# Patient Record
Sex: FEMALE | Race: WHITE | Employment: FULL TIME | URBAN - METROPOLITAN AREA
[De-identification: names, ages, dates, MRNs, and addresses within clinical notes are randomized per-mention and may not be internally consistent; named-entity substitution may affect disease eponyms.]

---

## 2017-03-27 ENCOUNTER — INITIAL CONSULT (OUTPATIENT)
Dept: SURGERY | Age: 42
End: 2017-03-27

## 2017-03-27 VITALS
WEIGHT: 187 LBS | BODY MASS INDEX: 31.92 KG/M2 | SYSTOLIC BLOOD PRESSURE: 100 MMHG | DIASTOLIC BLOOD PRESSURE: 60 MMHG | HEIGHT: 64 IN

## 2017-03-27 DIAGNOSIS — K80.00 ACUTE CALCULOUS CHOLECYSTITIS: Primary | ICD-10-CM

## 2017-03-27 PROCEDURE — 99243 OFF/OP CNSLTJ NEW/EST LOW 30: CPT | Performed by: SURGERY

## 2017-03-28 ENCOUNTER — HOSPITAL ENCOUNTER (OUTPATIENT)
Dept: SURGERY | Age: 42
Discharge: HOME | End: 2017-03-28
Attending: SURGERY | Admitting: SURGERY

## 2017-03-28 VITALS
SYSTOLIC BLOOD PRESSURE: 106 MMHG | RESPIRATION RATE: 13 BRPM | HEART RATE: 103 BPM | DIASTOLIC BLOOD PRESSURE: 69 MMHG | TEMPERATURE: 97 F | OXYGEN SATURATION: 95 %

## 2017-03-28 LAB
ALBUMIN SERPL-MCNC: 4.7 G/DL (ref 3.4–5)
ALP BLD-CCNC: 106 U/L (ref 40–129)
ALT SERPL-CCNC: 31 U/L (ref 10–40)
AST SERPL-CCNC: 47 U/L (ref 15–37)
BILIRUB SERPL-MCNC: 0.3 MG/DL (ref 0–1)
BILIRUBIN DIRECT: <0.2 MG/DL (ref 0–0.3)
BILIRUBIN, INDIRECT: ABNORMAL MG/DL (ref 0–1)
TOTAL PROTEIN: 7.7 G/DL (ref 6.4–8.2)

## 2017-03-28 PROCEDURE — 47562 LAPAROSCOPIC CHOLECYSTECTOMY: CPT | Performed by: SURGERY

## 2017-03-28 RX ORDER — LIDOCAINE HYDROCHLORIDE 10 MG/ML
INJECTION, SOLUTION EPIDURAL; INFILTRATION; INTRACAUDAL; PERINEURAL
Status: COMPLETED
Start: 2017-03-28 | End: 2017-03-28

## 2017-03-28 RX ORDER — HYDROMORPHONE HCL 110MG/55ML
0.5 PATIENT CONTROLLED ANALGESIA SYRINGE INTRAVENOUS EVERY 5 MIN PRN
Status: COMPLETED | OUTPATIENT
Start: 2017-03-28 | End: 2017-03-28

## 2017-03-28 RX ORDER — HYDRALAZINE HYDROCHLORIDE 20 MG/ML
5 INJECTION INTRAMUSCULAR; INTRAVENOUS EVERY 10 MIN PRN
Status: DISCONTINUED | OUTPATIENT
Start: 2017-03-28 | End: 2017-03-29 | Stop reason: HOSPADM

## 2017-03-28 RX ORDER — OXYCODONE HYDROCHLORIDE AND ACETAMINOPHEN 5; 325 MG/1; MG/1
2 TABLET ORAL ONCE
Status: COMPLETED | OUTPATIENT
Start: 2017-03-28 | End: 2017-03-28

## 2017-03-28 RX ORDER — SODIUM CHLORIDE 0.9 % (FLUSH) 0.9 %
10 SYRINGE (ML) INJECTION PRN
Status: DISCONTINUED | OUTPATIENT
Start: 2017-03-28 | End: 2017-03-29 | Stop reason: HOSPADM

## 2017-03-28 RX ORDER — MIDAZOLAM HYDROCHLORIDE 1 MG/ML
2 INJECTION INTRAMUSCULAR; INTRAVENOUS ONCE
Status: COMPLETED | OUTPATIENT
Start: 2017-03-28 | End: 2017-03-28

## 2017-03-28 RX ORDER — SODIUM CHLORIDE, SODIUM LACTATE, POTASSIUM CHLORIDE, CALCIUM CHLORIDE 600; 310; 30; 20 MG/100ML; MG/100ML; MG/100ML; MG/100ML
INJECTION, SOLUTION INTRAVENOUS
Status: COMPLETED
Start: 2017-03-28 | End: 2017-03-28

## 2017-03-28 RX ORDER — APREPITANT 40 MG/1
40 CAPSULE ORAL ONCE
Status: COMPLETED | OUTPATIENT
Start: 2017-03-28 | End: 2017-03-28

## 2017-03-28 RX ORDER — HEPARIN SODIUM 5000 [USP'U]/ML
5000 INJECTION, SOLUTION INTRAVENOUS; SUBCUTANEOUS EVERY 8 HOURS SCHEDULED
Status: DISCONTINUED | OUTPATIENT
Start: 2017-03-28 | End: 2017-03-29 | Stop reason: HOSPADM

## 2017-03-28 RX ORDER — OXYCODONE HYDROCHLORIDE 5 MG/1
TABLET ORAL
Qty: 40 TABLET | Refills: 0 | Status: SHIPPED | OUTPATIENT
Start: 2017-03-28 | End: 2018-06-26 | Stop reason: HOSPADM

## 2017-03-28 RX ORDER — SODIUM CHLORIDE 0.9 % (FLUSH) 0.9 %
10 SYRINGE (ML) INJECTION EVERY 12 HOURS SCHEDULED
Status: DISCONTINUED | OUTPATIENT
Start: 2017-03-28 | End: 2017-03-29 | Stop reason: HOSPADM

## 2017-03-28 RX ORDER — ONDANSETRON 2 MG/ML
4 INJECTION INTRAMUSCULAR; INTRAVENOUS
Status: COMPLETED | OUTPATIENT
Start: 2017-03-28 | End: 2017-03-28

## 2017-03-28 RX ORDER — LABETALOL HYDROCHLORIDE 5 MG/ML
5 INJECTION, SOLUTION INTRAVENOUS EVERY 10 MIN PRN
Status: DISCONTINUED | OUTPATIENT
Start: 2017-03-28 | End: 2017-03-29 | Stop reason: HOSPADM

## 2017-03-28 RX ORDER — PROMETHAZINE HYDROCHLORIDE 25 MG/ML
6.25 INJECTION, SOLUTION INTRAMUSCULAR; INTRAVENOUS EVERY 10 MIN PRN
Status: DISCONTINUED | OUTPATIENT
Start: 2017-03-28 | End: 2017-03-29 | Stop reason: HOSPADM

## 2017-03-28 RX ORDER — OXYCODONE HYDROCHLORIDE AND ACETAMINOPHEN 5; 325 MG/1; MG/1
1 TABLET ORAL
Status: ACTIVE | OUTPATIENT
Start: 2017-03-28 | End: 2017-03-28

## 2017-03-28 RX ORDER — SCOLOPAMINE TRANSDERMAL SYSTEM 1 MG/1
1 PATCH, EXTENDED RELEASE TRANSDERMAL ONCE
Status: DISCONTINUED | OUTPATIENT
Start: 2017-03-28 | End: 2017-03-29 | Stop reason: HOSPADM

## 2017-03-28 RX ORDER — MEPERIDINE HYDROCHLORIDE 25 MG/ML
12.5 INJECTION INTRAMUSCULAR; INTRAVENOUS; SUBCUTANEOUS EVERY 5 MIN PRN
Status: DISCONTINUED | OUTPATIENT
Start: 2017-03-28 | End: 2017-03-29 | Stop reason: HOSPADM

## 2017-03-28 RX ADMIN — MEPERIDINE HYDROCHLORIDE 12.5 MG: 25 INJECTION INTRAMUSCULAR; INTRAVENOUS; SUBCUTANEOUS at 15:35

## 2017-03-28 RX ADMIN — Medication 0.5 MG: at 15:35

## 2017-03-28 RX ADMIN — MIDAZOLAM HYDROCHLORIDE 2 MG: 1 INJECTION INTRAMUSCULAR; INTRAVENOUS at 12:09

## 2017-03-28 RX ADMIN — HEPARIN SODIUM 5000 UNITS: 5000 INJECTION, SOLUTION INTRAVENOUS; SUBCUTANEOUS at 12:09

## 2017-03-28 RX ADMIN — SODIUM CHLORIDE, SODIUM LACTATE, POTASSIUM CHLORIDE, CALCIUM CHLORIDE 1000 ML: 600; 310; 30; 20 INJECTION, SOLUTION INTRAVENOUS at 12:10

## 2017-03-28 RX ADMIN — Medication 0.5 MG: at 15:45

## 2017-03-28 RX ADMIN — ONDANSETRON 4 MG: 2 INJECTION INTRAMUSCULAR; INTRAVENOUS at 15:36

## 2017-03-28 RX ADMIN — OXYCODONE HYDROCHLORIDE AND ACETAMINOPHEN 2 TABLET: 5; 325 TABLET ORAL at 16:30

## 2017-03-28 RX ADMIN — Medication 0.5 MG: at 15:40

## 2017-03-28 RX ADMIN — LIDOCAINE HYDROCHLORIDE 2 ML: 10 INJECTION, SOLUTION EPIDURAL; INFILTRATION; INTRACAUDAL; PERINEURAL at 12:09

## 2017-03-28 RX ADMIN — Medication 0.5 MG: at 15:50

## 2017-03-28 RX ADMIN — APREPITANT 40 MG: 40 CAPSULE ORAL at 12:09

## 2017-03-28 ASSESSMENT — PAIN SCALES - GENERAL
PAINLEVEL_OUTOF10: 7
PAINLEVEL_OUTOF10: 6
PAINLEVEL_OUTOF10: 8
PAINLEVEL_OUTOF10: 0

## 2017-03-28 ASSESSMENT — PAIN - FUNCTIONAL ASSESSMENT: PAIN_FUNCTIONAL_ASSESSMENT: 0-10

## 2017-04-10 ENCOUNTER — OFFICE VISIT (OUTPATIENT)
Dept: SURGERY | Age: 42
End: 2017-04-10

## 2017-04-10 VITALS
DIASTOLIC BLOOD PRESSURE: 64 MMHG | SYSTOLIC BLOOD PRESSURE: 110 MMHG | HEIGHT: 64 IN | WEIGHT: 189 LBS | BODY MASS INDEX: 32.27 KG/M2

## 2017-04-10 DIAGNOSIS — Z09 SURGERY FOLLOW-UP EXAMINATION: Primary | ICD-10-CM

## 2017-04-10 PROCEDURE — 99024 POSTOP FOLLOW-UP VISIT: CPT | Performed by: SURGERY

## 2017-04-10 RX ORDER — ONDANSETRON 4 MG/1
4 TABLET, ORALLY DISINTEGRATING ORAL EVERY 8 HOURS PRN
Qty: 15 TABLET | Refills: 1 | Status: SHIPPED | OUTPATIENT
Start: 2017-04-10 | End: 2018-07-21 | Stop reason: ALTCHOICE

## 2017-04-13 ENCOUNTER — TELEPHONE (OUTPATIENT)
Dept: SURGERY | Age: 42
End: 2017-04-13

## 2017-04-20 ENCOUNTER — TELEPHONE (OUTPATIENT)
Dept: SURGERY | Age: 42
End: 2017-04-20

## 2017-04-20 RX ORDER — OMEPRAZOLE 20 MG/1
20 TABLET, DELAYED RELEASE ORAL DAILY
Qty: 30 TABLET | Refills: 1 | Status: SHIPPED | OUTPATIENT
Start: 2017-04-20 | End: 2018-04-02 | Stop reason: DRUGHIGH

## 2018-02-26 ENCOUNTER — OFFICE VISIT (OUTPATIENT)
Dept: ORTHOPEDIC SURGERY | Age: 43
End: 2018-02-26

## 2018-02-26 VITALS
SYSTOLIC BLOOD PRESSURE: 111 MMHG | WEIGHT: 179.9 LBS | BODY MASS INDEX: 30.71 KG/M2 | HEART RATE: 109 BPM | DIASTOLIC BLOOD PRESSURE: 76 MMHG | HEIGHT: 64 IN

## 2018-02-26 DIAGNOSIS — S42.254A NONDISPLACED FRACTURE OF GREATER TUBEROSITY OF RIGHT HUMERUS, INITIAL ENCOUNTER FOR CLOSED FRACTURE: Primary | ICD-10-CM

## 2018-02-26 PROCEDURE — 99203 OFFICE O/P NEW LOW 30 MIN: CPT | Performed by: ORTHOPAEDIC SURGERY

## 2018-02-26 PROCEDURE — L3660 SO 8 AB RSTR CAN/WEB PRE OTS: HCPCS | Performed by: ORTHOPAEDIC SURGERY

## 2018-02-26 NOTE — PROGRESS NOTES
Disp: , Rfl:     levothyroxine (SYNTHROID) 88 MCG tablet, Take 125 mcg by mouth daily , Disp: , Rfl:     aspirin 81 MG tablet, Take 81 mg by mouth daily. , Disp: , Rfl:     diazepam (VALIUM) 5 MG tablet, Take 5 mg by mouth nightly as needed for Anxiety (for headache). , Disp: , Rfl:     SUMAtriptan (IMITREX) 100 MG tablet, Take 1 tablet by mouth once as needed for Migraine for 1 dose., Disp: 9 tablet, Rfl: 0    folic acid (FOLVITE) 969 MCG tablet, Take 800 mcg by mouth nightly., Disp: , Rfl:     SUMAtriptan Succinate (SUMAVEL DOSEPRO) 6 MG/0.5ML ROSINA, Inject  into the skin as needed. , Disp: , Rfl:     promethazine (PHENERGAN) 25 MG tablet, Take 1 tablet by mouth every 6 hours as needed for Nausea., Disp: 12 tablet, Rfl: 0    omeprazole (PRILOSEC) 40 MG capsule, Take 40 mg by mouth daily. , Disp: , Rfl:   Allergies:  Codeine and Penicillins  Social History:    reports that she has never smoked. She has never used smokeless tobacco. She reports that she does not drink alcohol or use drugs. Family History:   Family History   Problem Relation Age of Onset    Diabetes Mother     Cancer Mother      OVARIAN    Kidney Disease Father        REVIEW OF SYSTEMS:   For new problems, a full review of systems will be found scanned in the patient's chart. CONSTITUTIONAL: Denies unexplained weight loss, fevers, chills   NEUROLOGICAL: Denies unsteady gait or progressive weakness  SKIN: Denies skin changes, delayed healing, rash, itching       PHYSICAL EXAM:    Vitals: Blood pressure 111/76, pulse 109, height 5' 4.02\" (1.626 m), weight 179 lb 14.3 oz (81.6 kg), last menstrual period 07/07/2014, not currently breastfeeding. GENERAL EXAM:  · General Apparence: Patient is adequately groomed with no evidence of malnutrition. · Orientation: The patient is oriented to time, place and person.    · Mood & Affect:The patient's mood and affect are appropriate       Right shoulder PHYSICAL EXAMINATION:  · Inspection:  No visible

## 2018-03-06 ENCOUNTER — TELEPHONE (OUTPATIENT)
Dept: ORTHOPEDIC SURGERY | Age: 43
End: 2018-03-06

## 2018-03-12 ENCOUNTER — OFFICE VISIT (OUTPATIENT)
Dept: ORTHOPEDIC SURGERY | Age: 43
End: 2018-03-12

## 2018-03-12 VITALS
BODY MASS INDEX: 30.71 KG/M2 | HEIGHT: 64 IN | WEIGHT: 179.9 LBS | HEART RATE: 106 BPM | DIASTOLIC BLOOD PRESSURE: 81 MMHG | SYSTOLIC BLOOD PRESSURE: 124 MMHG

## 2018-03-12 DIAGNOSIS — M25.511 RIGHT SHOULDER PAIN, UNSPECIFIED CHRONICITY: Primary | ICD-10-CM

## 2018-03-12 DIAGNOSIS — S42.254A NONDISPLACED FRACTURE OF GREATER TUBEROSITY OF RIGHT HUMERUS, INITIAL ENCOUNTER FOR CLOSED FRACTURE: ICD-10-CM

## 2018-03-12 PROCEDURE — 99213 OFFICE O/P EST LOW 20 MIN: CPT | Performed by: ORTHOPAEDIC SURGERY

## 2018-03-12 NOTE — PROGRESS NOTES
CHIEF COMPLAINT:    Chief Complaint   Patient presents with    Shoulder Pain     [facility] CK RT SHOULDER       HISTORY OF PRESENT ILLNESS:                The patient is a 36 y.o. female she presents today for repeat evaluation of her RIGHT shoulder injury from 2/23/2018. This is a Worker's Compensation injury. She works as a  and fell landing on the shoulder and somewhat onto her RIGHT knee. She was diagnosed the greater tuberosity fracture and conservative treatment was recommended. She presents today for repeat evaluation and x-ray. She has been in a sling and swath. She continues to note pain through the greater tuberosity of the proximal humerus. She does have a blood clot history and has been trying to stay active somewhat to avoid developing a blood clot. However, prolonged walking and standing do aggravate her shoulder pain.   Past Medical History:   Diagnosis Date    Anesthesia     PONV    Anxiety     [facility] thrombosis (DVT) ([facility])     Factor V Leiden ([facility] [address]     Hx of blood clots 2012    right forearm, after an IV    IBS (irritable bowel syndrome)     Migraine     MTHFR mutation ([facility])     Nausea & vomiting     Thyroid disease     hyperthyroid    Twin gestation, unspecified number of placenta, unspecified number of amniotic sacs(V91.00)           The pain assessment was noted & is as follows:  Pain Assessment  Location of Pain: Shoulder  Location Modifiers: Right  Severity of Pain: 6  Quality of Pain: Aching, Dull, Sharp  Duration of Pain: A few hours  Frequency of Pain: Several times daily  Aggravating Factors: Stretching, Bending  Limiting Behavior: Some  Relieving Factors: Rest  Result of Injury: No  Work-Related Injury: Yes  Are there other pain locations you wish to document?: No]      Work Status/Functionality:     Past Medical History: Medical history form was reviewed today & can be found in the media tab  Past Medical History:   Diagnosis Date    Anesthesia     PONV times daily, Disp: , Rfl:     protriptyline (VIVACTIL) 5 MG tablet, Take 5 mg by mouth every morning, Disp: , Rfl:     escitalopram (LEXAPRO) 20 MG tablet, Take 20 mg by mouth daily, Disp: , Rfl:     indomethacin (INDOCIN) 25 MG capsule, Take 25 mg by mouth daily as needed, Disp: , Rfl:     topiramate (TOPAMAX) 100 MG tablet, 100 mg 2 times daily. , Disp: , Rfl:     levothyroxine (SYNTHROID) 88 MCG tablet, Take 125 mcg by mouth daily , Disp: , Rfl:     aspirin 81 MG tablet, Take 81 mg by mouth daily. , Disp: , Rfl:     diazepam (VALIUM) 5 MG tablet, Take 5 mg by mouth nightly as needed for Anxiety (for headache). , Disp: , Rfl:     SUMAtriptan (IMITREX) 100 MG tablet, Take 1 tablet by mouth once as needed for Migraine for 1 dose., Disp: 9 tablet, Rfl: 0    folic acid (FOLVITE) 661 MCG tablet, Take 800 mcg by mouth nightly., Disp: , Rfl:     SUMAtriptan Succinate (SUMAVEL DOSEPRO) 6 MG/0.5ML ROSINA, Inject  into the skin as needed. , Disp: , Rfl:     promethazine (PHENERGAN) 25 MG tablet, Take 1 tablet by mouth every 6 hours as needed for Nausea., Disp: 12 tablet, Rfl: 0    omeprazole (PRILOSEC) 40 MG capsule, Take 40 mg by mouth daily. , Disp: , Rfl:   Allergies:  Codeine and Penicillins  Social History:    reports that she has never smoked. She has never used smokeless tobacco. She reports that she does not drink alcohol or use drugs. Family History:   Family History   Problem Relation Age of Onset    Diabetes Mother     Cancer Mother      OVARIAN    Kidney Disease Father        REVIEW OF SYSTEMS:   For new problems, a full review of systems will be found scanned in the patient's chart.   CONSTITUTIONAL: Denies unexplained weight loss, fevers, chills   NEUROLOGICAL: Denies unsteady gait or progressive weakness  SKIN: Denies skin changes, delayed healing, rash, itching       PHYSICAL EXAM:    Vitals: Blood pressure 124/81, pulse 106, height 5' 4.02\" (1.626 m), weight 179 lb 14.3 oz (81.6 kg), last menstrual period 07/07/2014, not currently breastfeeding. GENERAL EXAM:  · General Apparence: Patient is adequately groomed with no evidence of malnutrition. · Orientation: The patient is oriented to time, place and person. · Mood & Affect:The patient's mood and affect are appropriate       RIGHT shoulder PHYSICAL EXAMINATION:  · Inspection:  Upon inspection there is no obvious deformity ecchymosis or erythema today    · Palpation:  She remains quite tender over the area of the greater tuberosity      · Range of Motion: She tolerates gentle range of motion of the fingers hand wrist and elbow but does not move the shoulder today    · Strength:  strength does not demonstrate any gross weakness    · Special Tests:  Radial pulses palpable and 2+            · Skin:  There are no rashes, ulcerations or lesions. · Gait & station: Normal gait      · Additional Examinations:        Left Upper Extremity: Examination of the left upper extremity does not show any tenderness, deformity or injury. Range of motion is unremarkable. There is no gross instability. There are no rashes, ulcerations or lesions. Strength and tone are normal.      Diagnostic Testing:      Views:  3   Location:  RIGHT shoulder   Findings:  Nondisplaced greater tuberosity fracture    Orders     Orders Placed This Encounter   Procedures    XR SHOULDER RIGHT (MIN 2 VIEWS)     71715     Order Specific Question:   Reason for exam:     Answer:   Pain         Assessment / Treatment Plan:     1. Nondisplaced greater tuberosity fractureWorker's Comp. We will begin physical therapy for the RIGHT shoulder beginning with passive range of motion and transitioning to active range motion and strengthening as tolerated over the next 3-4 weeks. We will see the patient back in 3 weeks for reevaluation and x-ray. At that time we will determine if she can return to some type of work.     I will give her a note to go to the gym and ride a recumbent bike or walk on the treadmill in order to prevent blood clot. However, I recommended against driving except for emergencies. I have personally performed and/or participated in the history, exam and medical decision making and agree with all pertinent clinical information. I have also reviewed and agree with the past medical, family and social history unless otherwise noted. This dictation was performed with a verbal recognition program (DRAGON) and it was checked for errors. It is possible that there are still dictated errors within this office note. If so, please bring any errors to my attention for an addendum. All efforts were made to ensure that this office note is accurate.           Mendel Cooper MD

## 2018-03-19 ENCOUNTER — HOSPITAL ENCOUNTER (OUTPATIENT)
Dept: PHYSICAL THERAPY | Age: 43
Discharge: HOME | End: 2018-03-31
Admitting: ORTHOPAEDIC SURGERY

## 2018-03-22 ENCOUNTER — HOSPITAL ENCOUNTER (OUTPATIENT)
Dept: PHYSICAL THERAPY | Age: 43
Discharge: HOME | End: 2018-03-23
Admitting: ORTHOPAEDIC SURGERY

## 2018-03-22 NOTE — FLOWSHEET NOTE
[place] [facility] [facility] [facility], [address] [place]  Phone: [phone]  Fax [phone]      Physical Therapy Daily Treatment Note  Date:  3/22/2018    Patient Name:  Leanne Blair    :  1975  MRN: [id number]  Restrictions/Precautions:  H/O Blood clot  Medical/Treatment Diagnosis Information:  · Diagnosis: S42.254D - Nondisplaced fracture of greater tuberosity of right humerus, closed fracture; M25.511 R shoulder pain  · Treatment Diagnosis: S42.254D- Nondisplaced fracture of greater tuberosity of right humerus, closed fracture  Insurance/Certification information:  PT Insurance Information: [facility]  Physician Information:  Referring Practitioner: Diana Matias MD  Plan of care signed (Y/N):     Date of Patient follow up with Physician:     G-Code (if applicable):      Date G-Code Applied:    PT G-Codes  Functional Assessment Tool Used: QuickDASH  Score: 95%  Functional Limitation: Carrying, moving and handling objects  Carrying, Moving and Handling Objects Current Status (): At least 80 percent but less than 100 percent impaired, limited or restricted  Carrying, Moving and Handling Objects Goal Status (): At least 20 percent but less than 40 percent impaired, limited or restricted    Progress Note: []  Yes  [x]  No  Next due by: Visit #10      Latex Allergy:  [x]NO      []YES  Preferred Language for Healthcare:   [x]English       []other:    Visit # Insurance Allowable Requires auth   2 [facility] 12 visit 3/19/18-18    []no        [x]yes:[facility]     Pain level:  8/10     SUBJECTIVE: Patient reports that she is unable to do most of her exercises because she is in so much pain. She did not sleep well and only able to sleep 2-3 hours at a time.     OBJECTIVE: See eval  Observation:   Test measurements:  PROM Flexion 58, ER @ 0 Abd 17    RESTRICTIONS/PRECAUTIONS: PROM and transition to AROM in next 3-4 weeks    Exercises/Interventions:

## 2018-03-26 ENCOUNTER — HOSPITAL ENCOUNTER (OUTPATIENT)
Dept: PHYSICAL THERAPY | Age: 43
Discharge: HOME | End: 2018-03-27
Admitting: ORTHOPAEDIC SURGERY

## 2018-03-26 NOTE — FLOWSHEET NOTE
[place] [facility] [facility] [facility], [address]  Phone: [phone]  Fax [phone]      Physical Therapy Daily Treatment Note  Date:  3/26/2018    Patient Name:  Deidre Bauer    :  1975  MRN: [id number]  Restrictions/Precautions:  H/O Blood clot  Medical/Treatment Diagnosis Information:  · Diagnosis: S42.254D - Nondisplaced fracture of greater tuberosity of right humerus, closed fracture; M25.511 R shoulder pain  · Treatment Diagnosis: S42.254D- Nondisplaced fracture of greater tuberosity of right humerus, closed fracture  Insurance/Certification information:  PT Insurance Information: [facility]  Physician Information:  Referring Practitioner: Carlos Stewart MD  Plan of care signed (Y/N):     Date of Patient follow up with Physician:     G-Code (if applicable):      Date G-Code Applied:    PT G-Codes  Functional Assessment Tool Used: QuickDASH  Score: 95%  Functional Limitation: Carrying, moving and handling objects  Carrying, Moving and Handling Objects Current Status (): At least 80 percent but less than 100 percent impaired, limited or restricted  Carrying, Moving and Handling Objects Goal Status (): At least 20 percent but less than 40 percent impaired, limited or restricted    Progress Note: []  Yes  [x]  No  Next due by: Visit #10      Latex Allergy:  [x]NO      []YES  Preferred Language for Healthcare:   [x]English       []other:    Visit # Insurance Allowable Requires auth   3 [facility] 12 visit 3/19/18-18    []no        [x]yes:[facility]     Pain level:  8/10     SUBJECTIVE: Pt reports that her shoulder is still really sore, been having trouble doing the exercises, not doing table slide anymore because of pain.       OBJECTIVE:   Observation: Continued guarding with flex and ABD PROM along with pain  Test measurements:  Op note NV    RESTRICTIONS/PRECAUTIONS: PROM and transition to AROM in next 3-4 least 4+/5 to allow for proper functional mobility as indicated by patients Functional Deficits. 4. Patient will return to lifting 10# with R UE in order to carry groceries and perform functional activities without increased symptoms or restriction. 5. 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 [] Progression is slowed due to complexities listed. [] Progression has been slowed due to co-morbidities. [x] Plan just implemented, too soon to assess goals progression  [] Other:     ASSESSMENT: Continued guarding noted with PROM today, which makes it difficulty to progress. Pt cont to c/o significant shoulder pain in addition to shoulder/UE going numb with pain. Initiated U L UE exercises today to increase B scap mobility. Pt felt significant relief with elbow ext S against half wall as she had c/o cramping in distal biceps at elbow. Treatment/Activity Tolerance:  [] Patient tolerated treatment well [] Patient limited by fatique  [x] Patient limited by pain  [] Patient limited by other medical complications  [] Other:     Prognosis: [x] Good [] Fair  [] Poor    Patient Requires Follow-up: [x] Yes  [] No    PLAN: Check Table slides to see if patient ne this TE any better.   [x] Continue per plan of care [] Alter current plan (see comments)  [] Plan of care initiated [] Hold pending MD visit [] Discharge    Electronically signed by: Brittany Vegas, [address] [facility], DPT [id number]

## 2018-03-29 ENCOUNTER — HOSPITAL ENCOUNTER (OUTPATIENT)
Dept: PHYSICAL THERAPY | Age: 43
Discharge: HOME | End: 2018-03-30
Admitting: ORTHOPAEDIC SURGERY

## 2018-03-29 NOTE — OP NOTE
Follow up periodically to advance home exercise program to match level of function. [] Continue rehabitation due to objective improvement and continued functional deficits with progression to work conditioning. [] Discharge to post-rehab program secondary to maximizing \"medical necessity\" of physical / occupational therapy. [] Discharge independent in a home program as:  [] All goals achieved  [] Maximized \"medical necessity\" of physical / occupational therapy  [] No subjective or objective improvements    Plan: 2x per week for 8 weeks  Electronically signed by: Charline Knapp PT   License #: [id number]  Netta Ferraro [facility] Therapist was present, directed the patient's care, made skilled judgement, and was responsible for assessment and treatment of the patient.         Physician Recommendations:  [] Follow treatment plan as above [] Discontinue physical therapy  [] Change plan to: _______________________________________________________________

## 2018-03-29 NOTE — FLOWSHEET NOTE
allow for proper function of scapular, scapulothoracic and UE control with self care, carrying, lifting, driving/computer work. Home Exercise Program:    [x] (09347) Reviewed/Progressed HEP activities related to strengthening, flexibility, endurance, ROM of scapular, scapulothoracic and UE control with self care, reaching, carrying, lifting, house/yardwork, driving/computer work  [] (15901) Reviewed/Progressed HEP activities related to improving balance, coordination, kinesthetic sense, posture, motor skill, proprioception of scapular, scapulothoracic and UE control with self care, reaching, carrying, lifting, house/yardwork, driving/computer work      Manual Treatments:  PROM / STM / Oscillations-Mobs:  G-I, II, III, IV (PA's, Inf., Post.)  [x] (98154) Provided manual therapy to mobilize soft tissue/joints of cervical/CT, scapular GHJ and UE for the purpose of modulating pain, promoting relaxation,  increasing ROM, reducing/eliminating soft tissue swelling/inflammation/restriction, improving soft tissue extensibility and allowing for proper ROM for normal function with self care, reaching, carrying, lifting, house/yardwork, driving/computer work    Modalities:  HV/CP x15' R shoulder to reduce pain/inflammation 8:54-9:09    Charges:  Timed Code Treatment Minutes: 48   Total Treatment Minutes: 63     [] EVAL (LOW) 05268 (typically 20 minutes face-to-face)  [] EVAL (MOD) 90939 (typically 30 minutes face-to-face)  [] EVAL (HIGH) 67620 (typically 45 minutes face-to-face)  [] RE-EVAL     [x] YW(58475) x  1 8:31-8:54am  [] IONTO  [] NMR (65482) x      [] VASO  [x] Manual (74210) x  2  8:06-8:31am  [] Other:   [] TA x       [] Mech Traction (37114)  [] ES(attended) (85441)      [x] ES (un) (56701): 8:54-9:04am      GOALS:  Short Term Goals: To be achieved in: 2 weeks  1. Independent in HEP and progression per patient tolerance, in order to prevent re-injury.    2. Patient will have a decrease in pain to facilitate improvement in movement, function, and ADLs as indicated by Functional Deficits.     Long Term Goals: To be achieved in: 12 weeks  1. Disability index score of 35% or less for the [facility] to assist with reaching prior level of function. 2. Patient will demonstrate increased AROM to R shld flex, ABD at least 160 deg, ER/IR 70 deg to allow for proper joint functioning as indicated by patients Functional Deficits. 3. Patient will demonstrate an increase in Strength to R shoulder at least 4+/5 to allow for proper functional mobility as indicated by patients Functional Deficits. 4. Patient will return to lifting 10# with R UE in order to carry groceries and perform functional activities without increased symptoms or restriction. 5. 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 [x] Progression is slowed due to complexities listed: difficulty relaxing/pain   [] Progression has been slowed due to co-morbidities. [] Plan just implemented, too soon to assess goals progression  [] Other:     ASSESSMENT: Pt with marked guarding, pain and difficulty relaxing. She was tearful throughout demonstrating high anxiety. She was able to relax more for ex/stretching that were self AAROM rather than P/AROM. Time spent on pt ed for importance of relaxing/posture for gaining ROM and decreasing pain. Treatment/Activity Tolerance:  [] Patient tolerated treatment well [] Patient limited by fatique  [x] Patient limited by pain  [] Patient limited by other medical complications  [] Other:     Prognosis: [x] Good [] Fair  [] Poor    Patient Requires Follow-up: [x] Yes  [] No    PLAN: Check Table slides to see if patient ne this TE any better.   [x] Continue per plan of care [] Alter current plan (see comments)  [] Plan of care initiated []

## 2018-04-01 ENCOUNTER — HOSPITAL ENCOUNTER (OUTPATIENT)
Dept: PHYSICAL THERAPY | Age: 43
Discharge: HOME | End: 2018-04-30
Attending: ORTHOPAEDIC SURGERY | Admitting: ORTHOPAEDIC SURGERY

## 2018-04-02 ENCOUNTER — OFFICE VISIT (OUTPATIENT)
Dept: ORTHOPEDIC SURGERY | Age: 43
End: 2018-04-02

## 2018-04-02 ENCOUNTER — HOSPITAL ENCOUNTER (OUTPATIENT)
Dept: VASCULAR LAB | Age: 43
Discharge: HOME | End: 2018-04-02
Attending: PHYSICIAN ASSISTANT | Admitting: PHYSICIAN ASSISTANT

## 2018-04-02 VITALS
HEART RATE: 124 BPM | DIASTOLIC BLOOD PRESSURE: 79 MMHG | BODY MASS INDEX: 30.71 KG/M2 | WEIGHT: 179.9 LBS | SYSTOLIC BLOOD PRESSURE: 120 MMHG | HEIGHT: 64 IN

## 2018-04-02 DIAGNOSIS — S42.254A NONDISPLACED FRACTURE OF GREATER TUBEROSITY OF RIGHT HUMERUS, INITIAL ENCOUNTER FOR CLOSED FRACTURE: Primary | ICD-10-CM

## 2018-04-02 DIAGNOSIS — M79.601 RIGHT ARM PAIN: ICD-10-CM

## 2018-04-02 DIAGNOSIS — S42.254A CLOSED NONDISPLACED FRACTURE OF GREATER TUBEROSITY OF RIGHT HUMERUS: ICD-10-CM

## 2018-04-02 PROCEDURE — 99213 OFFICE O/P EST LOW 20 MIN: CPT | Performed by: PHYSICIAN ASSISTANT

## 2018-04-03 ENCOUNTER — TELEPHONE (OUTPATIENT)
Dept: ORTHOPEDIC SURGERY | Age: 43
End: 2018-04-03

## 2018-04-03 DIAGNOSIS — I82.621 ACUTE EMBOLISM AND THROMBOSIS OF DEEP VEIN OF RIGHT UPPER EXTREMITY (HCC): Primary | ICD-10-CM

## 2018-04-04 ENCOUNTER — TELEPHONE (OUTPATIENT)
Dept: ORTHOPEDIC SURGERY | Age: 43
End: 2018-04-04

## 2018-04-13 ENCOUNTER — HOSPITAL ENCOUNTER (OUTPATIENT)
Dept: PHYSICAL THERAPY | Age: 43
Discharge: HOME | End: 2018-04-14
Admitting: ORTHOPAEDIC SURGERY

## 2018-04-16 ENCOUNTER — OFFICE VISIT (OUTPATIENT)
Dept: ORTHOPEDIC SURGERY | Age: 43
End: 2018-04-16

## 2018-04-16 ENCOUNTER — HOSPITAL ENCOUNTER (OUTPATIENT)
Dept: PHYSICAL THERAPY | Age: 43
Discharge: HOME | End: 2018-04-17
Admitting: ORTHOPAEDIC SURGERY

## 2018-04-16 VITALS — WEIGHT: 179.9 LBS | HEIGHT: 64 IN | BODY MASS INDEX: 30.71 KG/M2

## 2018-04-16 DIAGNOSIS — M25.511 RIGHT SHOULDER PAIN, UNSPECIFIED CHRONICITY: Primary | ICD-10-CM

## 2018-04-16 DIAGNOSIS — S42.254A NONDISPLACED FRACTURE OF GREATER TUBEROSITY OF RIGHT HUMERUS, INITIAL ENCOUNTER FOR CLOSED FRACTURE: ICD-10-CM

## 2018-04-16 PROCEDURE — 99214 OFFICE O/P EST MOD 30 MIN: CPT | Performed by: ORTHOPAEDIC SURGERY

## 2018-04-18 ENCOUNTER — TELEPHONE (OUTPATIENT)
Dept: ORTHOPEDIC SURGERY | Age: 43
End: 2018-04-18

## 2018-04-18 ENCOUNTER — HOSPITAL ENCOUNTER (OUTPATIENT)
Dept: PHYSICAL THERAPY | Age: 43
Discharge: HOME | End: 2018-04-19
Admitting: ORTHOPAEDIC SURGERY

## 2018-04-18 DIAGNOSIS — S42.254A NONDISPLACED FRACTURE OF GREATER TUBEROSITY OF RIGHT HUMERUS, INITIAL ENCOUNTER FOR CLOSED FRACTURE: Primary | ICD-10-CM

## 2018-04-18 PROCEDURE — MISCPULLYB&C PULLY'S B&C: Performed by: ORTHOPAEDIC SURGERY

## 2018-04-25 ENCOUNTER — OFFICE VISIT (OUTPATIENT)
Dept: ORTHOPEDIC SURGERY | Age: 43
End: 2018-04-25

## 2018-04-25 VITALS
DIASTOLIC BLOOD PRESSURE: 77 MMHG | HEART RATE: 128 BPM | HEIGHT: 64 IN | BODY MASS INDEX: 30.71 KG/M2 | WEIGHT: 179.9 LBS | SYSTOLIC BLOOD PRESSURE: 106 MMHG

## 2018-04-25 DIAGNOSIS — M75.01 ADHESIVE CAPSULITIS OF RIGHT SHOULDER: Primary | ICD-10-CM

## 2018-04-25 DIAGNOSIS — S42.254A NONDISPLACED FRACTURE OF GREATER TUBEROSITY OF RIGHT HUMERUS, INITIAL ENCOUNTER FOR CLOSED FRACTURE: ICD-10-CM

## 2018-04-25 DIAGNOSIS — I82.621 ACUTE EMBOLISM AND THROMBOSIS OF DEEP VEIN OF RIGHT UPPER EXTREMITY (HCC): ICD-10-CM

## 2018-04-25 PROCEDURE — 99213 OFFICE O/P EST LOW 20 MIN: CPT | Performed by: PHYSICIAN ASSISTANT

## 2018-04-26 ENCOUNTER — HOSPITAL ENCOUNTER (OUTPATIENT)
Dept: PHYSICAL THERAPY | Age: 43
Discharge: HOME | End: 2018-04-27
Admitting: ORTHOPAEDIC SURGERY

## 2018-04-30 ENCOUNTER — HOSPITAL ENCOUNTER (OUTPATIENT)
Dept: PHYSICAL THERAPY | Age: 43
Discharge: HOME | End: 2018-05-01
Admitting: ORTHOPAEDIC SURGERY

## 2018-05-01 ENCOUNTER — HOSPITAL ENCOUNTER (OUTPATIENT)
Dept: PHYSICAL THERAPY | Age: 43
Discharge: HOME | End: 2018-05-31
Attending: ORTHOPAEDIC SURGERY | Admitting: ORTHOPAEDIC SURGERY

## 2018-05-02 ENCOUNTER — HOSPITAL ENCOUNTER (OUTPATIENT)
Dept: PHYSICAL THERAPY | Age: 43
Discharge: HOME | End: 2018-05-03
Admitting: ORTHOPAEDIC SURGERY

## 2018-05-03 ENCOUNTER — HOSPITAL ENCOUNTER (OUTPATIENT)
Dept: PHYSICAL THERAPY | Age: 43
Discharge: HOME | End: 2018-05-04
Admitting: ORTHOPAEDIC SURGERY

## 2018-05-03 ENCOUNTER — NURSE ONLY (OUTPATIENT)
Dept: ORTHOPEDIC SURGERY | Age: 43
End: 2018-05-03

## 2018-05-03 DIAGNOSIS — M75.01 ADHESIVE CAPSULITIS OF RIGHT SHOULDER: ICD-10-CM

## 2018-05-03 DIAGNOSIS — I82.621 ACUTE EMBOLISM AND THROMBOSIS OF DEEP VEIN OF RIGHT UPPER EXTREMITY (HCC): ICD-10-CM

## 2018-05-03 DIAGNOSIS — S42.254A NONDISPLACED FRACTURE OF GREATER TUBEROSITY OF RIGHT HUMERUS, INITIAL ENCOUNTER FOR CLOSED FRACTURE: Primary | ICD-10-CM

## 2018-05-03 PROCEDURE — 20611 DRAIN/INJ JOINT/BURSA W/US: CPT | Performed by: PHYSICIAN ASSISTANT

## 2018-05-03 PROCEDURE — 99999 PR OFFICE/OUTPT VISIT,PROCEDURE ONLY: CPT | Performed by: PHYSICIAN ASSISTANT

## 2018-05-16 ENCOUNTER — HOSPITAL ENCOUNTER (OUTPATIENT)
Dept: PHYSICAL THERAPY | Age: 43
Discharge: HOME | End: 2018-05-17
Admitting: ORTHOPAEDIC SURGERY

## 2018-05-21 ENCOUNTER — HOSPITAL ENCOUNTER (OUTPATIENT)
Dept: PHYSICAL THERAPY | Age: 43
Discharge: HOME | End: 2018-05-22
Admitting: ORTHOPAEDIC SURGERY

## 2018-05-23 ENCOUNTER — OFFICE VISIT (OUTPATIENT)
Dept: ORTHOPEDIC SURGERY | Age: 43
End: 2018-05-23

## 2018-05-23 ENCOUNTER — HOSPITAL ENCOUNTER (OUTPATIENT)
Dept: PHYSICAL THERAPY | Age: 43
Discharge: HOME | End: 2018-05-24
Admitting: ORTHOPAEDIC SURGERY

## 2018-05-23 VITALS — HEIGHT: 64 IN | WEIGHT: 179.9 LBS | BODY MASS INDEX: 30.71 KG/M2

## 2018-05-23 DIAGNOSIS — M75.01 ADHESIVE CAPSULITIS OF RIGHT SHOULDER: ICD-10-CM

## 2018-05-23 DIAGNOSIS — S42.254A NONDISPLACED FRACTURE OF GREATER TUBEROSITY OF RIGHT HUMERUS, INITIAL ENCOUNTER FOR CLOSED FRACTURE: ICD-10-CM

## 2018-05-23 DIAGNOSIS — I82.621 ACUTE EMBOLISM AND THROMBOSIS OF DEEP VEIN OF RIGHT UPPER EXTREMITY (HCC): Primary | ICD-10-CM

## 2018-05-23 PROCEDURE — 99213 OFFICE O/P EST LOW 20 MIN: CPT | Performed by: ORTHOPAEDIC SURGERY

## 2018-05-29 ENCOUNTER — HOSPITAL ENCOUNTER (OUTPATIENT)
Dept: PHYSICAL THERAPY | Age: 43
Discharge: HOME | End: 2018-05-30
Admitting: ORTHOPAEDIC SURGERY

## 2018-05-31 ENCOUNTER — HOSPITAL ENCOUNTER (OUTPATIENT)
Dept: PHYSICAL THERAPY | Age: 43
Discharge: HOME | End: 2018-06-01
Admitting: ORTHOPAEDIC SURGERY

## 2018-06-01 ENCOUNTER — HOSPITAL ENCOUNTER (OUTPATIENT)
Dept: PHYSICAL THERAPY | Age: 43
Discharge: HOME | End: 2018-06-30
Attending: ORTHOPAEDIC SURGERY | Admitting: ORTHOPAEDIC SURGERY

## 2018-06-05 ENCOUNTER — HOSPITAL ENCOUNTER (OUTPATIENT)
Dept: PHYSICAL THERAPY | Age: 43
Discharge: HOME | End: 2018-06-06
Admitting: ORTHOPAEDIC SURGERY

## 2018-06-06 ENCOUNTER — OFFICE VISIT (OUTPATIENT)
Dept: ORTHOPEDIC SURGERY | Age: 43
End: 2018-06-06

## 2018-06-06 VITALS — BODY MASS INDEX: 30.71 KG/M2 | HEIGHT: 64 IN | WEIGHT: 179.9 LBS

## 2018-06-06 DIAGNOSIS — M75.01 ADHESIVE CAPSULITIS OF RIGHT SHOULDER: Primary | ICD-10-CM

## 2018-06-06 DIAGNOSIS — S42.254A NONDISPLACED FRACTURE OF GREATER TUBEROSITY OF RIGHT HUMERUS, INITIAL ENCOUNTER FOR CLOSED FRACTURE: ICD-10-CM

## 2018-06-06 DIAGNOSIS — I82.621 ACUTE EMBOLISM AND THROMBOSIS OF DEEP VEIN OF RIGHT UPPER EXTREMITY (HCC): ICD-10-CM

## 2018-06-06 DIAGNOSIS — M25.511 RIGHT SHOULDER PAIN, UNSPECIFIED CHRONICITY: ICD-10-CM

## 2018-06-06 PROCEDURE — 99213 OFFICE O/P EST LOW 20 MIN: CPT | Performed by: ORTHOPAEDIC SURGERY

## 2018-06-13 DIAGNOSIS — M75.01 ADHESIVE BURSITIS OF RIGHT SHOULDER: Primary | ICD-10-CM

## 2018-06-26 ENCOUNTER — HOSPITAL ENCOUNTER (OUTPATIENT)
Dept: SURGERY | Age: 43
Discharge: HOME | End: 2018-06-26
Attending: ORTHOPAEDIC SURGERY | Admitting: ORTHOPAEDIC SURGERY

## 2018-06-26 VITALS
DIASTOLIC BLOOD PRESSURE: 64 MMHG | SYSTOLIC BLOOD PRESSURE: 111 MMHG | OXYGEN SATURATION: 96 % | TEMPERATURE: 96.8 F | RESPIRATION RATE: 16 BRPM | HEART RATE: 103 BPM

## 2018-06-26 DIAGNOSIS — Z98.890 S/P ARTHROSCOPY OF SHOULDER: Primary | ICD-10-CM

## 2018-06-26 RX ORDER — MEPERIDINE HYDROCHLORIDE 50 MG/ML
12.5 INJECTION INTRAMUSCULAR; INTRAVENOUS; SUBCUTANEOUS EVERY 5 MIN PRN
Status: DISCONTINUED | OUTPATIENT
Start: 2018-06-26 | End: 2018-06-27 | Stop reason: HOSPADM

## 2018-06-26 RX ORDER — HYDRALAZINE HYDROCHLORIDE 20 MG/ML
5 INJECTION INTRAMUSCULAR; INTRAVENOUS EVERY 10 MIN PRN
Status: DISCONTINUED | OUTPATIENT
Start: 2018-06-26 | End: 2018-06-27 | Stop reason: HOSPADM

## 2018-06-26 RX ORDER — SODIUM CHLORIDE 0.9 % (FLUSH) 0.9 %
10 SYRINGE (ML) INJECTION PRN
Status: DISCONTINUED | OUTPATIENT
Start: 2018-06-26 | End: 2018-06-27 | Stop reason: HOSPADM

## 2018-06-26 RX ORDER — SODIUM CHLORIDE 0.9 % (FLUSH) 0.9 %
10 SYRINGE (ML) INJECTION EVERY 12 HOURS SCHEDULED
Status: DISCONTINUED | OUTPATIENT
Start: 2018-06-26 | End: 2018-06-27 | Stop reason: HOSPADM

## 2018-06-26 RX ORDER — SCOLOPAMINE TRANSDERMAL SYSTEM 1 MG/1
1 PATCH, EXTENDED RELEASE TRANSDERMAL ONCE
Status: DISCONTINUED | OUTPATIENT
Start: 2018-06-26 | End: 2018-06-27 | Stop reason: HOSPADM

## 2018-06-26 RX ORDER — OXYCODONE HYDROCHLORIDE AND ACETAMINOPHEN 5; 325 MG/1; MG/1
1 TABLET ORAL PRN
Status: ACTIVE | OUTPATIENT
Start: 2018-06-26 | End: 2018-06-26

## 2018-06-26 RX ORDER — LIDOCAINE HYDROCHLORIDE 10 MG/ML
1 INJECTION, SOLUTION EPIDURAL; INFILTRATION; INTRACAUDAL; PERINEURAL
Status: ACTIVE | OUTPATIENT
Start: 2018-06-26 | End: 2018-06-26

## 2018-06-26 RX ORDER — SODIUM CHLORIDE, SODIUM LACTATE, POTASSIUM CHLORIDE, CALCIUM CHLORIDE 600; 310; 30; 20 MG/100ML; MG/100ML; MG/100ML; MG/100ML
INJECTION, SOLUTION INTRAVENOUS CONTINUOUS
Status: DISCONTINUED | OUTPATIENT
Start: 2018-06-26 | End: 2018-06-27 | Stop reason: HOSPADM

## 2018-06-26 RX ORDER — OXYCODONE HYDROCHLORIDE AND ACETAMINOPHEN 5; 325 MG/1; MG/1
2 TABLET ORAL PRN
Status: ACTIVE | OUTPATIENT
Start: 2018-06-26 | End: 2018-06-26

## 2018-06-26 RX ORDER — ONDANSETRON 2 MG/ML
4 INJECTION INTRAMUSCULAR; INTRAVENOUS EVERY 10 MIN PRN
Status: DISCONTINUED | OUTPATIENT
Start: 2018-06-26 | End: 2018-06-27 | Stop reason: HOSPADM

## 2018-06-26 RX ORDER — LABETALOL HYDROCHLORIDE 5 MG/ML
5 INJECTION, SOLUTION INTRAVENOUS EVERY 10 MIN PRN
Status: DISCONTINUED | OUTPATIENT
Start: 2018-06-26 | End: 2018-06-27 | Stop reason: HOSPADM

## 2018-06-26 RX ORDER — OXYCODONE HYDROCHLORIDE AND ACETAMINOPHEN 5; 325 MG/1; MG/1
1 TABLET ORAL EVERY 4 HOURS PRN
Qty: 30 TABLET | Refills: 0 | Status: SHIPPED | OUTPATIENT
Start: 2018-06-26 | End: 2018-07-01

## 2018-06-26 RX ADMIN — SODIUM CHLORIDE, SODIUM LACTATE, POTASSIUM CHLORIDE, CALCIUM CHLORIDE: 600; 310; 30; 20 INJECTION, SOLUTION INTRAVENOUS at 10:37

## 2018-06-26 RX ADMIN — Medication 0.5 MG: at 13:39

## 2018-06-26 RX ADMIN — Medication 0.5 MG: at 13:55

## 2018-06-26 ASSESSMENT — PAIN SCALES - GENERAL
PAINLEVEL_OUTOF10: 6
PAINLEVEL_OUTOF10: 7

## 2018-06-28 ENCOUNTER — HOSPITAL ENCOUNTER (OUTPATIENT)
Dept: PHYSICAL THERAPY | Age: 43
Discharge: HOME | End: 2018-06-29
Admitting: ORTHOPAEDIC SURGERY

## 2018-06-29 ENCOUNTER — OFFICE VISIT (OUTPATIENT)
Dept: ORTHOPEDIC SURGERY | Age: 43
End: 2018-06-29

## 2018-06-29 VITALS — HEIGHT: 64 IN | BODY MASS INDEX: 30.56 KG/M2 | WEIGHT: 179 LBS

## 2018-06-29 DIAGNOSIS — M75.01 ADHESIVE CAPSULITIS OF RIGHT SHOULDER: ICD-10-CM

## 2018-06-29 DIAGNOSIS — S42.254A NONDISPLACED FRACTURE OF GREATER TUBEROSITY OF RIGHT HUMERUS, INITIAL ENCOUNTER FOR CLOSED FRACTURE: Primary | ICD-10-CM

## 2018-06-29 DIAGNOSIS — I82.621 ACUTE EMBOLISM AND THROMBOSIS OF DEEP VEIN OF RIGHT UPPER EXTREMITY (HCC): ICD-10-CM

## 2018-06-29 PROCEDURE — 99024 POSTOP FOLLOW-UP VISIT: CPT | Performed by: PHYSICIAN ASSISTANT

## 2018-07-01 ENCOUNTER — HOSPITAL ENCOUNTER (OUTPATIENT)
Dept: PHYSICAL THERAPY | Age: 43
Discharge: HOME | End: 2018-07-01
Attending: ORTHOPAEDIC SURGERY | Admitting: ORTHOPAEDIC SURGERY

## 2018-07-02 ENCOUNTER — HOSPITAL ENCOUNTER (OUTPATIENT)
Dept: PHYSICAL THERAPY | Age: 43
Discharge: HOME | End: 2018-07-03
Admitting: ORTHOPAEDIC SURGERY

## 2018-07-02 NOTE — FLOWSHEET NOTE
[place] [facility] [address] [facility], [address]  Phone: [phone]  Fax [phone]      Physical Therapy Daily Treatment Note  Date:  2018    Patient Name:  Mary Ellen Connor    :  1975  MRN: [id number]  Restrictions/Precautions:    Medical/Treatment Diagnosis Information:  · Diagnosis: M75.01 (ICD-10-CM) - Adhesive bursitis of right shoulder s/p ascope 18 debridement, NATALIYA, Neer  · Treatment Diagnosis: M75.01 (ICD-10-CM) - Adhesive bursitis of right shoulder  Insurance/Certification information:  PT Insurance Information: [facility] 12 visits through 18  Physician Information:  Referring Practitioner: Leanna Organ of care signed (Y/N):     Date of Patient follow up with Physician:     G-Code (if applicable):      Date G-Code Applied:  18  PT G-Codes  Functional Assessment Tool Used: Qdash  Score: 93%  Functional Limitation: Carrying, moving and handling objects  Carrying, Moving and Handling Objects Current Status (): At least 80 percent but less than 100 percent impaired, limited or restricted  Carrying, Moving and Handling Objects Goal Status (): At least 20 percent but less than 40 percent impaired, limited or restricted    Progress Note: []  Yes  []  No  Next due by: Visit #10      Latex Allergy:  [x]NO      []YES  Preferred Language for Healthcare:   [x]English       []other:    Visit # Insurance Allowable Requires auth    12 expires 18    []no        [x]yes:     Pain level:  8-9/10     SUBJECTIVE:  Pt reports she felt a pop in her ant shoulder on Saturday doing the table slide and she has had a lot of pain since then. She is late today due to feeling really nauseated.     OBJECTIVE: See eval  Observation:   Test measurements:  PROM flex 120, abd 65    RESTRICTIONS/PRECAUTIONS: Hx DVT    Exercises/Interventions:   Therapeutic Ex Sets/rep comments   Shoulder shrugs, scap sets  HEP   Seated cane ER S  Supine cane ER S 10x10\"  10x10\" HEP   Table slides flexion  Table ER S 10x10\"  10x10\" HEP   pendulums x2' HEP   AROM biceps, wrist, hand HEP   UT S HEP             Brandt's flexion x12    Cage flexion squat x10                                            Pt/daughter ed: POC, HEP, activity mod and sling weaning, icing, posture x10'    Manual Intervention     PROM R shoulder all planes, oscillations, gr 3 post/inf GH mobs x15'                                NMR re-education                                                 Therapeutic Exercise and NMR EXR  [x] (32875) Provided verbal/tactile cueing for activities related to strengthening, flexibility, endurance, ROM  for improvements in scapular, scapulothoracic and UE control with self care, reaching, carrying, lifting, house/yardwork, driving/computer work. [x] (17141) Provided verbal/tactile cueing for activities related to improving balance, coordination, kinesthetic sense, posture, motor skill, proprioception  to assist with  scapular, scapulothoracic and UE control with self care, reaching, carrying, lifting, house/yardwork, driving/computer work. Therapeutic Activities:    [] (19609 or 26833) Provided verbal/tactile cueing for activities related to improving balance, coordination, kinesthetic sense, posture, motor skill, proprioception and motor activation to allow for proper function of scapular, scapulothoracic and UE control with self care, carrying, lifting, driving/computer work.      Home Exercise Program:    [x] (08133) Reviewed/Progressed HEP activities related to strengthening, flexibility, endurance, ROM of scapular, scapulothoracic and UE control with self care, reaching, carrying, lifting, house/yardwork, driving/computer work  [] (32879) Reviewed/Progressed HEP activities related to improving balance, coordination, kinesthetic sense, posture, motor skill, proprioception of scapular, scapulothoracic and UE control with self care, reaching,

## 2018-07-03 ENCOUNTER — HOSPITAL ENCOUNTER (OUTPATIENT)
Dept: PHYSICAL THERAPY | Age: 43
Discharge: HOME | End: 2018-07-04
Admitting: ORTHOPAEDIC SURGERY

## 2018-07-03 NOTE — FLOWSHEET NOTE
functioning as indicated by patients Functional Deficits. 3. Patient will demonstrate an increase in Strength to grossly 4/5 R shoulder to allow for proper functional mobility as indicated by patients Functional Deficits. 4. Patient will return to overhead reaching functional activities without increased symptoms or restriction. 5. Pt will be able to resume full duty at work. Progression Towards Functional goals:  [x] Patient is progressing as expected towards functional goals listed. [x] Progression is slowed due to complexities listed. ---NATALIYA  [] Progression has been slowed due to co-morbidities. [] Plan just implemented, too soon to assess goals progression  [] Other:     ASSESSMENT:  Flexion PROM improved this date. Continued spasms and guarding though pec and UT. Cues to relax arm and encourage arm swing with gait.     Treatment/Activity Tolerance:  [] Patient tolerated treatment well [] Patient limited by fatique  [x] Patient limited by pain  [] Patient limited by other medical complications  [] Other:     Prognosis: [x] Good [] Fair  [] Poor    Patient Requires Follow-up: [x] Yes  [] No    PLAN: See eval  [x] Continue per plan of care [] Alter current plan (see comments)  [] Plan of care initiated [] Hold pending MD visit [] Discharge    Electronically signed by: Dave Agarwal PT

## 2018-07-05 ENCOUNTER — HOSPITAL ENCOUNTER (OUTPATIENT)
Dept: PHYSICAL THERAPY | Age: 43
Discharge: HOME | End: 2018-07-06
Admitting: ORTHOPAEDIC SURGERY

## 2018-07-05 NOTE — FLOWSHEET NOTE
[facility] [facility] [facility], [address] [place]  Phone: [phone]  Fax [phone]      Physical Therapy Daily Treatment Note  Date:  2018    Patient Name:  Lisa Campbell    :  1975  MRN: [id number]  Restrictions/Precautions:    Medical/Treatment Diagnosis Information:  · Diagnosis: M75.01 (ICD-10-CM) - Adhesive bursitis of right shoulder s/p ascope 18 debridement, NATALIYA, Neer  · Treatment Diagnosis: M75.01 (ICD-10-CM) - Adhesive bursitis of right shoulder  Insurance/Certification information:  PT Insurance Information: [id number] 12 visits through 18  Physician Information:  Referring Practitioner: Gladis Sun of care signed (Y/N):      Date of Patient follow up with Physician:     G-Code (if applicable):      Date G-Code Applied:  18  PT G-Codes  Functional Assessment Tool Used: Qdash  Score: 93%  Functional Limitation: Carrying, moving and handling objects  Carrying, Moving and Handling Objects Current Status (): At least 80 percent but less than 100 percent impaired, limited or restricted  Carrying, Moving and Handling Objects Goal Status (): At least 20 percent but less than 40 percent impaired, limited or restricted    Progress Note: []  Yes  []  No  Next due by: Visit #10      Latex Allergy:  [x]NO      []YES  Preferred Language for Healthcare:   [x]English       []other:    Visit # Insurance Allowable Requires auth    12 expires 18    []no        [x]yes:     Pain level:  5-6/10     SUBJECTIVE: Pt reports she was able to drive herself to PT today. Still getting spasms anterior shoulder, but she did a lot of stretching at home yesterday and that helps.     OBJECTIVE: See eval  Observation:   Test measurements:       AROM IR back pocket    RESTRICTIONS/PRECAUTIONS: Hx DVT    Exercises/Interventions:   Therapeutic Ex Sets/rep comments   Shoulder shrugs, scap sets  HEP   Seated cane ER S  Supine allow for proper joint functioning as indicated by patients Functional Deficits. 3. Patient will demonstrate an increase in Strength to grossly 4/5 R shoulder to allow for proper functional mobility as indicated by patients Functional Deficits. 4. Patient will return to overhead reaching functional activities without increased symptoms or restriction. 5. Pt will be able to resume full duty at work. Progression Towards Functional goals:  [x] Patient is progressing as expected towards functional goals listed. [x] Progression is slowed due to complexities listed. ---NATALIYA  [] Progression has been slowed due to co-morbidities. [] Plan just implemented, too soon to assess goals progression  [] Other:     ASSESSMENT:  Decreased tenderness pec and clavicle. Continued tightness lats, subscap and triceps. Able to progress stretching and gentle strengthening and functional movement without muscle spasms this date. .    Treatment/Activity Tolerance:  [] Patient tolerated treatment well [] Patient limited by fatique  [x] Patient limited by pain  [] Patient limited by other medical complications  [] Other:     Prognosis: [x] Good [] Fair  [] Poor    Patient Requires Follow-up: [x] Yes  [] No    PLAN: See eval  [x] Continue per plan of care [] Alter current plan (see comments)  [] Plan of care initiated [] Hold pending MD visit [] Discharge    Electronically signed by: Jennifer Jordan, PT

## 2018-07-09 ENCOUNTER — HOSPITAL ENCOUNTER (OUTPATIENT)
Dept: PHYSICAL THERAPY | Age: 43
Discharge: HOME | End: 2018-07-10
Admitting: ORTHOPAEDIC SURGERY

## 2018-07-09 NOTE — FLOWSHEET NOTE
(77542) Reviewed/Progressed HEP activities related to strengthening, flexibility, endurance, ROM of scapular, scapulothoracic and UE control with self care, reaching, carrying, lifting, house/yardwork, driving/computer work  [] (75665) Reviewed/Progressed HEP activities related to improving balance, coordination, kinesthetic sense, posture, motor skill, proprioception of scapular, scapulothoracic and UE control with self care, reaching, carrying, lifting, house/yardwork, driving/computer work      Manual Treatments:  PROM / STM / Oscillations-Mobs:  G-I, II, III, IV (PA's, Inf., Post.)  [x] (60269) Provided manual therapy to mobilize soft tissue/joints of cervical/CT, scapular GHJ and UE for the purpose of modulating pain, promoting relaxation,  increasing ROM, reducing/eliminating soft tissue swelling/inflammation/restriction, improving soft tissue extensibility and allowing for proper ROM for normal function with self care, reaching, carrying, lifting, house/yardwork, driving/computer work    Modalities:  PM/CP x15' R shoulder 10:40-10:55    Charges:  Timed Code Treatment Minutes: 60   Total Treatment Minutes: 75     [] EVAL (LOW) 47966 (typically 20 minutes face-to-face)  [] EVAL (MOD) 97514 (typically 30 minutes face-to-face)  [] EVAL (HIGH) 13424 (typically 45 minutes face-to-face)  [] RE-EVAL     [x] UL(82723) x  2 10:05-10:40 [] IONTO  [] NMR (54062) x      [] VASO  [x] Manual (26386) x  2   9:40-10:05 [] Other:  [] TA x       [] Mech Traction (76831)  [] ES(attended) (78664)      [x] ES (un) (08782):     Joe Kim stated goal: able to resume full duty work    Therapist goals for Patient:   Short Term Goals: To be achieved in: 2 weeks  1. Independent in HEP and progression per patient tolerance, in order to prevent re-injury. MET  2. Patient will have a decrease in pain to facilitate improvement in movement, function, and ADLs as indicated by Functional Deficits. PROGRESSING    Long Term Goals:  To be achieved in: 8 weeks  1. Disability index score of 30 % or less for the [facility] to assist with reaching prior level of function. 2. Patient will demonstrate increased AROM to [facility] R shoulder to allow for proper joint functioning as indicated by patients Functional Deficits. 3. Patient will demonstrate an increase in Strength to grossly 4/5 R shoulder to allow for proper functional mobility as indicated by patients Functional Deficits. 4. Patient will return to overhead reaching functional activities without increased symptoms or restriction. 5. Pt will be able to resume full duty at work. Progression Towards Functional goals:  [x] Patient is progressing as expected towards functional goals listed. [x] Progression is slowed due to complexities listed. ---NATALIYA  [] Progression has been slowed due to co-morbidities. [] Plan just implemented, too soon to assess goals progression  [] Other:     ASSESSMENT:  Patient tolerated MT as it progressed. Initial muscle guarding ER, but improved with A-P pressure over AC jt. Patient tolerated gentle modified IR strap stretch and doorway pec stretch well. Additional exercises held secondary to patient's pain/soreness. Continue ROM exercises as patient tolerates. Treatment/Activity Tolerance:  [] Patient tolerated treatment well [] Patient limited by fatique  [x] Patient limited by pain  [] Patient limited by other medical complications  [] Other:     Prognosis: [x] Good [] Fair  [] Poor    Patient Requires Follow-up: [x] Yes  [] No    PLAN: See eval  [x] Continue per plan of care [] Alter current plan (see comments)  [] Plan of care initiated [] Hold pending MD visit [] Discharge    Electronically signed by: LIZBETH Thacker SPT  Therapist was present, directed the patient's care, made skilled judgement, and was responsible for assessment and treatment of the patient.

## 2018-07-12 ENCOUNTER — TELEPHONE (OUTPATIENT)
Dept: ORTHOPEDIC SURGERY | Age: 43
End: 2018-07-12

## 2018-07-12 NOTE — TELEPHONE ENCOUNTER
LETTER REQUEST FROM STEFANIE MARTINEZ/ THE [employer]  FOR LETTER REGARDING YOUR OPINION WITHIN A REASONABLE DEGREE OF MEDICAL CERTAINTY AS TO THE CASUAL RELATIONSHIP BETWEEN THE WORK- RELATED INJURY AND THE DIAGNOSIS.     LETTER IS SCANNED IN MEDIA

## 2018-07-16 ENCOUNTER — HOSPITAL ENCOUNTER (OUTPATIENT)
Dept: PHYSICAL THERAPY | Age: 43
Setting detail: THERAPIES SERIES
Discharge: HOME | End: 2018-07-16
Payer: COMMERCIAL

## 2018-07-18 ENCOUNTER — HOSPITAL ENCOUNTER (EMERGENCY)
Age: 43
Discharge: HOME | End: 2018-07-18
Attending: EMERGENCY MEDICINE
Payer: COMMERCIAL

## 2018-07-18 ENCOUNTER — HOSPITAL ENCOUNTER (OUTPATIENT)
Dept: PHYSICAL THERAPY | Age: 43
Setting detail: THERAPIES SERIES
Discharge: HOME | End: 2018-07-18
Payer: COMMERCIAL

## 2018-07-18 VITALS
SYSTOLIC BLOOD PRESSURE: 105 MMHG | HEIGHT: 64 IN | DIASTOLIC BLOOD PRESSURE: 60 MMHG | RESPIRATION RATE: 16 BRPM | BODY MASS INDEX: 28.68 KG/M2 | OXYGEN SATURATION: 100 % | WEIGHT: 168 LBS | TEMPERATURE: 98.2 F | HEART RATE: 90 BPM

## 2018-07-18 DIAGNOSIS — R19.7 NAUSEA VOMITING AND DIARRHEA: Primary | ICD-10-CM

## 2018-07-18 DIAGNOSIS — R11.2 NAUSEA VOMITING AND DIARRHEA: Primary | ICD-10-CM

## 2018-07-18 DIAGNOSIS — R10.9 ABDOMINAL CRAMPING: ICD-10-CM

## 2018-07-18 DIAGNOSIS — E86.0 DEHYDRATION: ICD-10-CM

## 2018-07-18 DIAGNOSIS — R53.1 GENERALIZED WEAKNESS: ICD-10-CM

## 2018-07-18 LAB
A/G RATIO: 1.4 (ref 1.1–2.2)
ALBUMIN SERPL-MCNC: 4.3 G/DL (ref 3.4–5)
ALP BLD-CCNC: 106 U/L (ref 40–129)
ALT SERPL-CCNC: 6 U/L (ref 10–40)
ANION GAP SERPL CALCULATED.3IONS-SCNC: 10 MMOL/L (ref 3–16)
AST SERPL-CCNC: 16 U/L (ref 15–37)
BACTERIA: ABNORMAL /HPF
BASOPHILS ABSOLUTE: 0 K/UL (ref 0–0.2)
BASOPHILS RELATIVE PERCENT: 0.7 %
BILIRUB SERPL-MCNC: 0.3 MG/DL (ref 0–1)
BILIRUBIN URINE: NEGATIVE
BLOOD, URINE: NEGATIVE
BUN BLDV-MCNC: 13 MG/DL (ref 7–20)
CALCIUM SERPL-MCNC: 9.5 MG/DL (ref 8.3–10.6)
CHLORIDE BLD-SCNC: 106 MMOL/L (ref 99–110)
CLARITY: CLEAR
CO2: 24 MMOL/L (ref 21–32)
COLOR: YELLOW
CREAT SERPL-MCNC: 0.7 MG/DL (ref 0.6–1.1)
EOSINOPHILS ABSOLUTE: 0.2 K/UL (ref 0–0.6)
EOSINOPHILS RELATIVE PERCENT: 3.2 %
EPITHELIAL CELLS, UA: ABNORMAL /HPF
GFR AFRICAN AMERICAN: >60
GFR NON-AFRICAN AMERICAN: >60
GLOBULIN: 3 G/DL
GLUCOSE BLD-MCNC: 118 MG/DL (ref 70–99)
GLUCOSE URINE: NEGATIVE MG/DL
HCT VFR BLD CALC: 41.1 % (ref 36–48)
HEMOGLOBIN: 13.9 G/DL (ref 12–16)
KETONES, URINE: NEGATIVE MG/DL
LEUKOCYTE ESTERASE, URINE: ABNORMAL
LIPASE: 56 U/L (ref 13–60)
LYMPHOCYTES ABSOLUTE: 2.6 K/UL (ref 1–5.1)
LYMPHOCYTES RELATIVE PERCENT: 40.6 %
MCH RBC QN AUTO: 31.4 PG (ref 26–34)
MCHC RBC AUTO-ENTMCNC: 33.9 G/DL (ref 31–36)
MCV RBC AUTO: 92.7 FL (ref 80–100)
MICROSCOPIC EXAMINATION: YES
MONOCYTES ABSOLUTE: 0.5 K/UL (ref 0–1.3)
MONOCYTES RELATIVE PERCENT: 8.5 %
NEUTROPHILS ABSOLUTE: 3 K/UL (ref 1.7–7.7)
NEUTROPHILS RELATIVE PERCENT: 47 %
NITRITE, URINE: NEGATIVE
PDW BLD-RTO: 13.5 % (ref 12.4–15.4)
PH UA: 6.5
PLATELET # BLD: 353 K/UL (ref 135–450)
PMV BLD AUTO: 8.7 FL (ref 5–10.5)
POTASSIUM SERPL-SCNC: 3.6 MMOL/L (ref 3.5–5.1)
PROTEIN UA: NEGATIVE MG/DL
RBC # BLD: 4.43 M/UL (ref 4–5.2)
RBC UA: ABNORMAL /HPF (ref 0–2)
SODIUM BLD-SCNC: 140 MMOL/L (ref 136–145)
SPECIFIC GRAVITY UA: 1.01
TOTAL PROTEIN: 7.3 G/DL (ref 6.4–8.2)
TROPONIN: <0.01 NG/ML
URINE TYPE: ABNORMAL
UROBILINOGEN, URINE: 0.2 E.U./DL
WBC # BLD: 6.4 K/UL (ref 4–11)
WBC UA: ABNORMAL /HPF (ref 0–5)

## 2018-07-18 PROCEDURE — 6360000002 HC RX W HCPCS: Performed by: NURSE PRACTITIONER

## 2018-07-18 PROCEDURE — 99284 EMERGENCY DEPT VISIT MOD MDM: CPT

## 2018-07-18 PROCEDURE — 80053 COMPREHEN METABOLIC PANEL: CPT

## 2018-07-18 PROCEDURE — 84484 ASSAY OF TROPONIN QUANT: CPT

## 2018-07-18 PROCEDURE — 96365 THER/PROPH/DIAG IV INF INIT: CPT

## 2018-07-18 PROCEDURE — 97140 MANUAL THERAPY 1/> REGIONS: CPT | Performed by: PHYSICAL THERAPIST

## 2018-07-18 PROCEDURE — 81001 URINALYSIS AUTO W/SCOPE: CPT

## 2018-07-18 PROCEDURE — 93005 ELECTROCARDIOGRAM TRACING: CPT | Performed by: EMERGENCY MEDICINE

## 2018-07-18 PROCEDURE — 83690 ASSAY OF LIPASE: CPT

## 2018-07-18 PROCEDURE — 96372 THER/PROPH/DIAG INJ SC/IM: CPT

## 2018-07-18 PROCEDURE — G0283 ELEC STIM OTHER THAN WOUND: HCPCS | Performed by: PHYSICAL THERAPIST

## 2018-07-18 PROCEDURE — 85025 COMPLETE CBC W/AUTO DIFF WBC: CPT

## 2018-07-18 PROCEDURE — 2580000003 HC RX 258: Performed by: NURSE PRACTITIONER

## 2018-07-18 PROCEDURE — 6360000002 HC RX W HCPCS: Performed by: EMERGENCY MEDICINE

## 2018-07-18 PROCEDURE — 6370000000 HC RX 637 (ALT 250 FOR IP): Performed by: EMERGENCY MEDICINE

## 2018-07-18 PROCEDURE — 96375 TX/PRO/DX INJ NEW DRUG ADDON: CPT

## 2018-07-18 PROCEDURE — 97110 THERAPEUTIC EXERCISES: CPT | Performed by: PHYSICAL THERAPIST

## 2018-07-18 PROCEDURE — 2580000003 HC RX 258: Performed by: EMERGENCY MEDICINE

## 2018-07-18 PROCEDURE — 96361 HYDRATE IV INFUSION ADD-ON: CPT

## 2018-07-18 PROCEDURE — 96366 THER/PROPH/DIAG IV INF ADDON: CPT

## 2018-07-18 RX ORDER — ONDANSETRON 2 MG/ML
4 INJECTION INTRAMUSCULAR; INTRAVENOUS ONCE
Status: COMPLETED | OUTPATIENT
Start: 2018-07-18 | End: 2018-07-18

## 2018-07-18 RX ORDER — DICYCLOMINE HYDROCHLORIDE 10 MG/ML
20 INJECTION INTRAMUSCULAR 4 TIMES DAILY
Status: DISCONTINUED | OUTPATIENT
Start: 2018-07-18 | End: 2018-07-18 | Stop reason: HOSPADM

## 2018-07-18 RX ORDER — PROMETHAZINE HYDROCHLORIDE 25 MG/1
25 TABLET ORAL EVERY 8 HOURS PRN
Qty: 20 TABLET | Refills: 0 | Status: SHIPPED | OUTPATIENT
Start: 2018-07-18 | End: 2018-07-21 | Stop reason: ALTCHOICE

## 2018-07-18 RX ORDER — 0.9 % SODIUM CHLORIDE 0.9 %
1000 INTRAVENOUS SOLUTION INTRAVENOUS ONCE
Status: COMPLETED | OUTPATIENT
Start: 2018-07-18 | End: 2018-07-18

## 2018-07-18 RX ORDER — PROMETHAZINE HYDROCHLORIDE 25 MG/1
25 TABLET ORAL ONCE
Status: COMPLETED | OUTPATIENT
Start: 2018-07-18 | End: 2018-07-18

## 2018-07-18 RX ORDER — DEXTROSE AND SODIUM CHLORIDE 5; .9 G/100ML; G/100ML
1000 INJECTION, SOLUTION INTRAVENOUS ONCE
Status: COMPLETED | OUTPATIENT
Start: 2018-07-18 | End: 2018-07-18

## 2018-07-18 RX ORDER — HYDROCODONE BITARTRATE AND ACETAMINOPHEN 5; 325 MG/1; MG/1
1 TABLET ORAL ONCE
Status: COMPLETED | OUTPATIENT
Start: 2018-07-18 | End: 2018-07-18

## 2018-07-18 RX ORDER — HYDROCODONE BITARTRATE AND ACETAMINOPHEN 5; 325 MG/1; MG/1
1 TABLET ORAL EVERY 8 HOURS PRN
Qty: 5 TABLET | Refills: 0 | Status: SHIPPED | OUTPATIENT
Start: 2018-07-18 | End: 2018-07-20

## 2018-07-18 RX ORDER — ONDANSETRON 4 MG/1
4 TABLET, ORALLY DISINTEGRATING ORAL EVERY 8 HOURS PRN
Qty: 20 TABLET | Refills: 0 | Status: SHIPPED | OUTPATIENT
Start: 2018-07-18 | End: 2018-07-21 | Stop reason: CLARIF

## 2018-07-18 RX ADMIN — SODIUM CHLORIDE 1000 ML: 9 INJECTION, SOLUTION INTRAVENOUS at 14:22

## 2018-07-18 RX ADMIN — PROMETHAZINE HYDROCHLORIDE 25 MG: 25 TABLET ORAL at 16:36

## 2018-07-18 RX ADMIN — HYDROCODONE BITARTRATE AND ACETAMINOPHEN 1 TABLET: 5; 325 TABLET ORAL at 16:36

## 2018-07-18 RX ADMIN — ONDANSETRON 4 MG: 2 INJECTION INTRAMUSCULAR; INTRAVENOUS at 14:22

## 2018-07-18 RX ADMIN — DEXTROSE AND SODIUM CHLORIDE 1000 ML: 5; 900 INJECTION, SOLUTION INTRAVENOUS at 16:36

## 2018-07-18 RX ADMIN — DICYCLOMINE HYDROCHLORIDE 20 MG: 20 INJECTION, SOLUTION INTRAMUSCULAR at 15:16

## 2018-07-18 ASSESSMENT — PAIN SCALES - GENERAL
PAINLEVEL_OUTOF10: 2
PAINLEVEL_OUTOF10: 5

## 2018-07-18 NOTE — FLOWSHEET NOTE
Sets/rep comments   Shoulder shrugs, scap sets  HEP   Seated cane ER S  Supine cane ER S  HEP   Table slides flexion  Table ER S HEP   pendulums HEP   AROM biceps, wrist, hand HEP   UT S HEP   Semi prone standing end of table ext scap cues   SL ER         Brandt's flexion / scaption x25 Flexion 140 degrees   Cage flexion squat  Rotation S  IR S 10\"x10  x10  x10    TB alternating row  Scap/UT cues R   Finisher W  Circles cw/ccw 4# x15 ea  4# x15 ea; 4# x 15    Doorway stretch B UE's 3 x 20\" For HEP   Modified IR strap, gentle  TB IR S   RTT 10\"x6 HEP   Wall slide w/ triceps S 5x20\" Pillow case             ATC  NV    Pt/daughter ed: POC, HEP, activity mod and sling weaning, icing, posture     Manual Intervention     PROM R shoulder all planes, oscillations, gr 3-4 post/inf GH mobs, SL scap mobs, STM pecs, rhomboids, post shoulder, subscap x25' Pressure to UT w/ Abd decreased pain and guarding, pain relief with inf mobs                               NMR re-education                                                 Therapeutic Exercise and NMR EXR  [x] (31311) Provided verbal/tactile cueing for activities related to strengthening, flexibility, endurance, ROM  for improvements in scapular, scapulothoracic and UE control with self care, reaching, carrying, lifting, house/yardwork, driving/computer work. [x] (89476) Provided verbal/tactile cueing for activities related to improving balance, coordination, kinesthetic sense, posture, motor skill, proprioception  to assist with  scapular, scapulothoracic and UE control with self care, reaching, carrying, lifting, house/yardwork, driving/computer work.     Therapeutic Activities:    [] (92198 or 71644) Provided verbal/tactile cueing for activities related to improving balance, coordination, kinesthetic sense, posture, motor skill, proprioception and motor activation to allow for proper function of scapular, scapulothoracic and UE control with self care, carrying, lifting, Deficits. PROGRESSING    Long Term Goals: To be achieved in: 8 weeks  1. Disability index score of 30 % or less for the [facility] to assist with reaching prior level of function. 2. Patient will demonstrate increased [facility] to allow for proper joint functioning as indicated by patients Functional Deficits. 3. Patient will demonstrate an increase in Strength to grossly 4/5 R shoulder to allow for proper functional mobility as indicated by patients Functional Deficits. 4. Patient will return to overhead reaching functional activities without increased symptoms or restriction. 5. Pt will be able to resume full duty at work. Progression Towards Functional goals:  [x] Patient is progressing as expected towards functional goals listed. [x] Progression is slowed due to complexities listed. ---NATALIYA  [] Progression has been slowed due to co-morbidities. [] Plan just implemented, too soon to assess goals progression  [] Other:     ASSESSMENT:  Tightness noted with triceps with PROM. Good stretch reported with wall slide/triceps S combo. ROM progressed as measured on pulleys.      Treatment/Activity Tolerance:  [x] Patient tolerated treatment well [] Patient limited by fatique  [] Patient limited by pain  [] Patient limited by other medical complications  [] Other:     Prognosis: [x] Good [] Fair  [] Poor    Patient Requires Follow-up: [x] Yes  [] No    PLAN: See eval  [x] Continue per plan of care [] Alter current plan (see comments)  [] Plan of care initiated [] Hold pending MD visit [] Discharge    Electronically signed by: Mae Rose PT

## 2018-07-18 NOTE — ED PROVIDER NOTES
[facility] Emergency Department    CHIEF COMPLAINT  Emesis (Pt reports has been vomiting and having diarrhea for a week. Pt was at [facility] priority care and was told to come to ER for eval. PT denies any fevers. ) and Diarrhea      HISTORY OF PRESENT ILLNESS  Madelaine Barton is a 39 y.o. female who presents to the ED complaining of abdominal cramping with nausea, vomiting, diarrhea ×1 week. Patient reports feeling lightheaded due to dehydration no dizziness. Patient reports difficulty with taking a deep breath denies any shortness of breath or chest pain or tightness. No numbness or tingling in extremities. No unilateral weakness. Abdominal cramping worse not relieved with bowel movements or emesis. No dark or tarry stools. No dysuria, hematuria, urinary urgency, frequency, or retention. No unilateral weakness. No saddle paresthesias. No neck or back pain. No fevers or chills. Patient reports one to priority care today and sent here for further treatment and evaluation. Patient reports having shoulder surgery recently and has missed physical therapy but when today and felt worse afterwards. No other complaints, modifying factors or associated symptoms. Nursing notes reviewed.    Past Medical History:   Diagnosis Date    Anesthesia     PONV    Anxiety     [facility] (DVT) ([facility])     Factor [facility] Leiden ([facility]     Fracture of proximal humerus 02/2018    right    Hx of blood clots 2012    right forearm, after an IV    Hx of blood clots 04/2018    right arm post fx shoulder    IBS (irritable bowel syndrome)     Migraine     MTHFR mutation ([facility])     Nausea & vomiting     PONV (postoperative nausea and vomiting)     Thyroid disease     hyperthyroidism-S/P radioactive iodine    Twin gestation, unspecified number of placenta, unspecified number of amniotic sacs(V91.00)      Past Surgical History:   Procedure Laterality Date    ARTHROPLASTY  12/16/2011    HARDWARE RIGHT FOOT, MENENDEZ ARTHROPLASTY WITH ROBLEDO IMPLANT RIGHT    CHOLECYSTECTOMY, LAPAROSCOPIC  03/28/2017    with dr Domingo Sheth  12/2010    Right    HYSTERECTOMY Right 07/18/14    supercervical with right oopherectomy laproscopic    HYSTERECTOMY      HYSTEROSCOPY  6/7/2013    HYSTEROSCOPY AND NOVASURE ABLATION    INGUINAL HERNIA REPAIR Right 8/27/14    laparoscopic right inguinal hernia repair with mesh    OTHER SURGICAL HISTORY  6/7/2011    RIGHT FIRST METATARSAL PHALANGEAL JOINT REPLACEMENT    ROTATOR CUFF REPAIR  06/26/2018    right    SHOULDER SURGERY      left rotator cuff repair     Family History   Problem Relation Age of Onset    Diabetes Mother     Cancer Mother         OVARIAN    Kidney Disease Father      Social History     Social History    Marital status:      Spouse name: N/A    Number of children: N/A    Years of education: N/A     Occupational History    Not on file. Social History Main Topics    Smoking status: Never Smoker    Smokeless tobacco: Never Used    Alcohol use No    Drug use: No    Sexual activity: Yes     Partners: Male     Other Topics Concern    Not on file     Social History Narrative    No narrative on file     No current facility-administered medications for this encounter.       Current Outpatient Prescriptions   Medication Sig Dispense Refill    levothyroxine (SYNTHROID) 112 MCG tablet Take 112 mcg by mouth Daily      rivaroxaban (XARELTO) 20 MG TABS tablet Take 1 tablet by mouth daily (with breakfast) Refills to be managed by PCP 60 tablet 0    sertraline (ZOLOFT) 100 MG tablet Take 200 mg by mouth daily      protriptyline (VIVACTIL) 10 MG tablet Take 10 mg by mouth every morning      ranitidine (ZANTAC) 150 MG tablet Take 150 mg by mouth nightly      ondansetron (ZOFRAN ODT) 4 MG disintegrating tablet Take 1 tablet by mouth every 8 hours as needed for Nausea 15 tablet 1    baclofen (LIORESAL) 10 MG tablet Take 10 mg by mouth 2 Pulses palpable bilateral radial/post tib +2/2 equal bilaterally. No cervical, thoracic, lumbar midline bony tenderness, no step-off or crepitus. SKIN: Warm and dry. No acute rashes or lesions. NEUROLOGICAL: Alert and oriented. CN's 2-12 intact. No gross facial drooping. Strength 5/5, sensation intact. No focal/motor deficits. PSYCHIATRIC: Normal mood and affect. RADIOLOGY  No results found. ED COURSE/MDM/DISPOSITION   I have evaluated this patient in collaboration with Dr. Debbie Crawford. Vital signs stable. Patient received zofran, benytl, and NS bolus for pain/nausea, with good relief. Microscopic urinalysis revealed 3-5 WBCs, 0-2 RBCs, negative for nitrites. CBC revealed no leukocytosis or acute abnormalities. CMP unremarkable. Lipase normal at 56. A discussion was had with Mrs. Mccarthy regarding ED findings. All questions were answered. Patient care independently transferred to Dr. Debbie Crawford from this point forward in ED course, medical decision-making, and disposition. See Dr. Gloria Carmona note for further documentation.            Loa Hodgkin, DEANGELO - CNP  07/18/18 7295

## 2018-07-19 ENCOUNTER — HOSPITAL ENCOUNTER (OUTPATIENT)
Dept: PHYSICAL THERAPY | Age: 43
Discharge: HOME | End: 2018-07-19

## 2018-07-19 LAB
EKG ATRIAL RATE: 98 BPM
EKG DIAGNOSIS: NORMAL
EKG P AXIS: 67 DEGREES
EKG P-R INTERVAL: 154 MS
EKG Q-T INTERVAL: 388 MS
EKG QRS DURATION: 76 MS
EKG QTC CALCULATION (BAZETT): 495 MS
EKG R AXIS: 4 DEGREES
EKG T AXIS: 81 DEGREES
EKG VENTRICULAR RATE: 98 BPM

## 2018-07-19 PROCEDURE — 93010 ELECTROCARDIOGRAM REPORT: CPT | Performed by: INTERNAL MEDICINE

## 2018-07-19 NOTE — FLOWSHEET NOTE
[facility] [facility],  [address]      Physical Therapy  Cancellation/No-show Note  Patient Name:  Kyler Diaz  :  1975   Date:  2018  Cancelled visits to date: 4  No-shows to date: 0    For today's appointment patient:  [x]  Cancelled  []  Rescheduled appointment  []  No-show     Reason given by patient:  []  Patient ill  []  Conflicting appointment  []  No transportation    []  Conflict with work  []  No reason given  []  Other:     Comments:      Electronically signed by:  Andi Li PT

## 2018-07-21 ENCOUNTER — HOSPITAL ENCOUNTER (EMERGENCY)
Age: 43
Discharge: HOME | End: 2018-07-21
Attending: EMERGENCY MEDICINE
Payer: COMMERCIAL

## 2018-07-21 ENCOUNTER — APPOINTMENT (OUTPATIENT)
Dept: CT IMAGING | Age: 43
End: 2018-07-21
Payer: COMMERCIAL

## 2018-07-21 VITALS
HEART RATE: 95 BPM | TEMPERATURE: 96.7 F | DIASTOLIC BLOOD PRESSURE: 48 MMHG | OXYGEN SATURATION: 99 % | SYSTOLIC BLOOD PRESSURE: 107 MMHG | RESPIRATION RATE: 16 BRPM

## 2018-07-21 DIAGNOSIS — R10.31 RIGHT LOWER QUADRANT ABDOMINAL PAIN: Primary | ICD-10-CM

## 2018-07-21 DIAGNOSIS — R19.7 DIARRHEA, UNSPECIFIED TYPE: ICD-10-CM

## 2018-07-21 DIAGNOSIS — R11.2 NON-INTRACTABLE VOMITING WITH NAUSEA, UNSPECIFIED VOMITING TYPE: ICD-10-CM

## 2018-07-21 LAB
A/G RATIO: 1.4 (ref 1.1–2.2)
ALBUMIN SERPL-MCNC: 4.4 G/DL (ref 3.4–5)
ALP BLD-CCNC: 109 U/L (ref 40–129)
ALT SERPL-CCNC: <5 U/L (ref 10–40)
ANION GAP SERPL CALCULATED.3IONS-SCNC: 12 MMOL/L (ref 3–16)
AST SERPL-CCNC: 14 U/L (ref 15–37)
BASOPHILS ABSOLUTE: 0 K/UL (ref 0–0.2)
BASOPHILS RELATIVE PERCENT: 0.8 %
BILIRUB SERPL-MCNC: 0.3 MG/DL (ref 0–1)
BILIRUBIN URINE: NEGATIVE
BLOOD, URINE: NEGATIVE
BUN BLDV-MCNC: 9 MG/DL (ref 7–20)
CALCIUM SERPL-MCNC: 9.4 MG/DL (ref 8.3–10.6)
CHLORIDE BLD-SCNC: 106 MMOL/L (ref 99–110)
CLARITY: CLEAR
CO2: 22 MMOL/L (ref 21–32)
COLOR: YELLOW
CREAT SERPL-MCNC: 0.8 MG/DL (ref 0.6–1.1)
EOSINOPHILS ABSOLUTE: 0.1 K/UL (ref 0–0.6)
EOSINOPHILS RELATIVE PERCENT: 1.9 %
GFR AFRICAN AMERICAN: >60
GFR NON-AFRICAN AMERICAN: >60
GLOBULIN: 3.1 G/DL
GLUCOSE BLD-MCNC: 107 MG/DL (ref 70–99)
GLUCOSE URINE: NEGATIVE MG/DL
HCT VFR BLD CALC: 41.8 % (ref 36–48)
HEMOGLOBIN: 14 G/DL (ref 12–16)
KETONES, URINE: NEGATIVE MG/DL
LACTIC ACID: 1 MMOL/L (ref 0.4–2)
LEUKOCYTE ESTERASE, URINE: NEGATIVE
LIPASE: 41 U/L (ref 13–60)
LYMPHOCYTES ABSOLUTE: 2.3 K/UL (ref 1–5.1)
LYMPHOCYTES RELATIVE PERCENT: 41 %
MCH RBC QN AUTO: 31.2 PG (ref 26–34)
MCHC RBC AUTO-ENTMCNC: 33.5 G/DL (ref 31–36)
MCV RBC AUTO: 93.4 FL (ref 80–100)
MICROSCOPIC EXAMINATION: NORMAL
MONOCYTES ABSOLUTE: 0.5 K/UL (ref 0–1.3)
MONOCYTES RELATIVE PERCENT: 8.4 %
NEUTROPHILS ABSOLUTE: 2.7 K/UL (ref 1.7–7.7)
NEUTROPHILS RELATIVE PERCENT: 47.9 %
NITRITE, URINE: NEGATIVE
PDW BLD-RTO: 13.6 % (ref 12.4–15.4)
PH UA: 7
PLATELET # BLD: 324 K/UL (ref 135–450)
PMV BLD AUTO: 8.8 FL (ref 5–10.5)
POTASSIUM SERPL-SCNC: 3.9 MMOL/L (ref 3.5–5.1)
PROTEIN UA: NEGATIVE MG/DL
RBC # BLD: 4.47 M/UL (ref 4–5.2)
SODIUM BLD-SCNC: 140 MMOL/L (ref 136–145)
SPECIFIC GRAVITY UA: 1.02
TOTAL PROTEIN: 7.5 G/DL (ref 6.4–8.2)
URINE TYPE: NORMAL
UROBILINOGEN, URINE: 0.2 E.U./DL
WBC # BLD: 5.7 K/UL (ref 4–11)

## 2018-07-21 PROCEDURE — 2580000003 HC RX 258: Performed by: NURSE PRACTITIONER

## 2018-07-21 PROCEDURE — 85025 COMPLETE CBC W/AUTO DIFF WBC: CPT

## 2018-07-21 PROCEDURE — 83605 ASSAY OF LACTIC ACID: CPT

## 2018-07-21 PROCEDURE — 6370000000 HC RX 637 (ALT 250 FOR IP): Performed by: NURSE PRACTITIONER

## 2018-07-21 PROCEDURE — 74177 CT ABD & PELVIS W/CONTRAST: CPT

## 2018-07-21 PROCEDURE — 96375 TX/PRO/DX INJ NEW DRUG ADDON: CPT

## 2018-07-21 PROCEDURE — 6360000004 HC RX CONTRAST MEDICATION: Performed by: NURSE PRACTITIONER

## 2018-07-21 PROCEDURE — 80053 COMPREHEN METABOLIC PANEL: CPT

## 2018-07-21 PROCEDURE — 6360000002 HC RX W HCPCS: Performed by: NURSE PRACTITIONER

## 2018-07-21 PROCEDURE — 83690 ASSAY OF LIPASE: CPT

## 2018-07-21 PROCEDURE — 96374 THER/PROPH/DIAG INJ IV PUSH: CPT

## 2018-07-21 PROCEDURE — 96361 HYDRATE IV INFUSION ADD-ON: CPT

## 2018-07-21 PROCEDURE — 81003 URINALYSIS AUTO W/O SCOPE: CPT

## 2018-07-21 PROCEDURE — 99284 EMERGENCY DEPT VISIT MOD MDM: CPT

## 2018-07-21 RX ORDER — ONDANSETRON 4 MG/1
4 TABLET, ORALLY DISINTEGRATING ORAL EVERY 8 HOURS PRN
Qty: 20 TABLET | Refills: 0 | Status: ON HOLD | OUTPATIENT
Start: 2018-07-21 | End: 2019-01-10

## 2018-07-21 RX ORDER — DICYCLOMINE HYDROCHLORIDE 10 MG/1
10 CAPSULE ORAL ONCE
Status: COMPLETED | OUTPATIENT
Start: 2018-07-21 | End: 2018-07-21

## 2018-07-21 RX ORDER — ONDANSETRON 2 MG/ML
4 INJECTION INTRAMUSCULAR; INTRAVENOUS ONCE
Status: COMPLETED | OUTPATIENT
Start: 2018-07-21 | End: 2018-07-21

## 2018-07-21 RX ORDER — MORPHINE SULFATE 4 MG/ML
4 INJECTION, SOLUTION INTRAMUSCULAR; INTRAVENOUS ONCE
Status: COMPLETED | OUTPATIENT
Start: 2018-07-21 | End: 2018-07-21

## 2018-07-21 RX ORDER — 0.9 % SODIUM CHLORIDE 0.9 %
1000 INTRAVENOUS SOLUTION INTRAVENOUS ONCE
Status: COMPLETED | OUTPATIENT
Start: 2018-07-21 | End: 2018-07-21

## 2018-07-21 RX ORDER — DICYCLOMINE HYDROCHLORIDE 10 MG/1
10 CAPSULE ORAL EVERY 6 HOURS PRN
Qty: 20 CAPSULE | Refills: 0 | Status: ON HOLD | OUTPATIENT
Start: 2018-07-21 | End: 2019-01-10

## 2018-07-21 RX ORDER — VITAMIN E 268 MG
400 CAPSULE ORAL DAILY
COMMUNITY

## 2018-07-21 RX ADMIN — MORPHINE SULFATE 4 MG: 4 INJECTION INTRAVENOUS at 11:38

## 2018-07-21 RX ADMIN — DICYCLOMINE HYDROCHLORIDE 10 MG: 10 CAPSULE ORAL at 12:21

## 2018-07-21 RX ADMIN — ONDANSETRON HYDROCHLORIDE 4 MG: 2 INJECTION, SOLUTION INTRAMUSCULAR; INTRAVENOUS at 11:38

## 2018-07-21 RX ADMIN — IOPAMIDOL 75 ML: 755 INJECTION, SOLUTION INTRAVENOUS at 11:59

## 2018-07-21 RX ADMIN — SODIUM CHLORIDE 1000 ML: 9 INJECTION, SOLUTION INTRAVENOUS at 11:38

## 2018-07-21 ASSESSMENT — ENCOUNTER SYMPTOMS
COUGH: 0
NAUSEA: 1
ABDOMINAL DISTENTION: 0
DIARRHEA: 1
WHEEZING: 0
ALLERGIC/IMMUNOLOGIC NEGATIVE: 1
EYES NEGATIVE: 1
BACK PAIN: 0
ABDOMINAL PAIN: 1
VOMITING: 1
SHORTNESS OF BREATH: 0
CONSTIPATION: 0

## 2018-07-21 ASSESSMENT — PAIN SCALES - GENERAL
PAINLEVEL_OUTOF10: 7
PAINLEVEL_OUTOF10: 6
PAINLEVEL_OUTOF10: 7
PAINLEVEL_OUTOF10: 3

## 2018-07-21 ASSESSMENT — PAIN DESCRIPTION - LOCATION
LOCATION: ABDOMEN
LOCATION: ABDOMEN

## 2018-07-21 ASSESSMENT — PAIN DESCRIPTION - PAIN TYPE
TYPE: ACUTE PAIN
TYPE: ACUTE PAIN

## 2018-07-21 ASSESSMENT — PAIN DESCRIPTION - ORIENTATION: ORIENTATION: RIGHT;LOWER

## 2018-07-21 NOTE — ED PROVIDER NOTES
Emergency [facility]  ED    Patient: García Harrington  MRN: [id number]  : 1975  Date of Evaluation: 2018  ED Supervising Physician: Jessa Weinstein MD    I independently examined and evaluated García Harrington. In brief, García Harrington is a 39 y.o. female that presents to the emergency department for nausea, vomiting, diarrhea, abdominal pain, worse right lower quadrant. No nausea, vomiting, diarrhea for several days infection improving, but now has irregular quadrant abdominal pain. No fever or chills. Focused exam: No acute distress. Moist mucous members. Abdomen soft, nontender eyes tenderness to palpation and tenderness right lower quadrant. No rebound or guarding. Brief ED course/MDM: Labs and imaging reviewed, reassuring, no evidence of appendicitis. Patient gradually feeling better here. Patient's systolic blood pressure decreased following pain medication, easily responsive to fluids. Patient feeling somewhat better here. Discussed continued symptomatically management home, follow-up, return precautions; patient verbalized understanding and agreement, stable for discharge. All diagnostic, treatment, and disposition decisions were made by myself in conjunction with the MALU/Resident. For all further details of the patient's emergency department visit, please see their documentation.     (Please note that portions of this note may have been completed with a voice recognition program. Efforts were made to edit the dictations but occasionally words are mis-transcribed.)    Jessa Weinstein MD  [address] [facility]        Jessa Weinstein MD  18 [phone]

## 2018-07-21 NOTE — ED PROVIDER NOTES
Allergic/Immunologic: Negative. Neurological: Negative for dizziness, seizures, syncope, speech difficulty, weakness, light-headedness, numbness and headaches. Hematological: Negative. Psychiatric/Behavioral: Negative. Positives and Pertinent negatives as per HPI. Except as noted above in the ROS, all other systems were reviewed and negative.        PAST MEDICAL HISTORY     Past Medical History:   Diagnosis Date    Anesthesia     PONV    Anxiety     [facility]DV[facility]) [facility])     [facility] Leiden ([facility] [address]     Fracture of proximal humerus 02/2018    right    Hx of blood clots 2012    right forearm, after an IV    Hx of blood clots 04/2018    right arm post fx shoulder    IBS (irritable bowel syndrome)     Migraine     MTHFR mutation ([facility])     Nausea & vomiting     PONV (postoperative nausea and vomiting)     Thyroid disease     hyperthyroidism-S/P radioactive iodine    Twin gestation, unspecified number of placenta, unspecified number of amniotic sacs(V91.00)          SURGICAL HISTORY       Past Surgical History:   Procedure Laterality Date    ARTHROPLASTY  12/16/2011    HARDWARE RIGHT FOOT, MENENDEZ ARTHROPLASTY WITH ROBLEDO IMPLANT RIGHT    CHOLECYSTECTOMY, LAPAROSCOPIC  03/28/2017    with dr John Huntley  12/2010    Right    HYSTERECTOMY Right 07/18/14    supercervical with right oopherectomy laproscopic    HYSTERECTOMY      HYSTEROSCOPY  6/7/2013    HYSTEROSCOPY AND NOVASURE ABLATION    INGUINAL HERNIA REPAIR Right 8/27/14    laparoscopic right inguinal hernia repair with mesh    OTHER SURGICAL HISTORY  6/7/2011    RIGHT FIRST METATARSAL PHALANGEAL JOINT REPLACEMENT    ROTATOR CUFF REPAIR  06/26/2018    right    SHOULDER SURGERY      left rotator cuff repair         CURRENT MEDICATIONS       Previous Medications    BACLOFEN (LIORESAL) 10 MG TABLET    Take 10 mg by mouth 2 times daily    INDOMETHACIN (INDOCIN) 25 MG CAPSULE    Take 25 mg by mouth daily as needed Laboratory  [address] [place] [address], [address] [place] [address]   Phone [phone]       All other labs were within normal range or not returned as of this dictation. EKG: All EKG's are interpreted by the Emergency Department Physician who either signs or Co-signs this chart in the absence of a cardiologist.  Please see their note for interpretation of EKG. RADIOLOGY:   Non-plain film images such as CT, Ultrasound and MRI are read by the radiologist. Plain radiographic images are visualized and preliminarily interpreted by the  ED Provider with the below findings:        Interpretation per the Radiologist below, if available at the time of this note:    CT ABDOMEN PELVIS W IV CONTRAST Additional Contrast? None   Final Result   No acute process. In particular, no evidence of appendicitis           No results found. PROCEDURES   Unless otherwise noted below, none     Procedures    CRITICAL CARE TIME   N/A    CONSULTS:  None      EMERGENCY DEPARTMENT COURSE and DIFFERENTIAL DIAGNOSIS/MDM:   Vitals:    Vitals:    07/21/18 1107 07/21/18 1254   BP: 127/80 (!) 89/52   Pulse: 111 99   Resp: 17 16   Temp: 97.6 °F (36.4 °C) 96.7 °F (35.9 °C)   TempSrc: Oral Oral   SpO2: 100% 98%       Patient was given the following medications:  Medications   0.9 % sodium chloride bolus (1,000 mLs Intravenous New Bag 7/21/18 1138)   dicyclomine (BENTYL) capsule 10 mg (10 mg Oral Given 7/21/18 1221)   ondansetron (ZOFRAN) injection 4 mg (4 mg Intravenous Given 7/21/18 1138)   morphine (PF) injection 4 mg (4 mg Intravenous Given 7/21/18 1138)   iopamidol (ISOVUE-370) 76 % injection 75 mL (75 mLs Intravenous Given 7/21/18 1159)       Patient appears pale and mucus membranes are dry. She is alert and oriented x4. Patient is tachy with regular rhythm. Lungs are clear throughout all fields. Abdomen is soft and non-distended. There is RLQ pain with palpation. No guarding or rebound noted.    Differentials: appendicitis, colitis, enteritis,

## 2018-07-23 ENCOUNTER — OFFICE VISIT (OUTPATIENT)
Dept: ORTHOPEDIC SURGERY | Age: 43
End: 2018-07-23

## 2018-07-23 ENCOUNTER — HOSPITAL ENCOUNTER (OUTPATIENT)
Dept: PHYSICAL THERAPY | Age: 43
Setting detail: THERAPIES SERIES
Discharge: HOME | End: 2018-07-23
Payer: COMMERCIAL

## 2018-07-23 VITALS — BODY MASS INDEX: 30.56 KG/M2 | WEIGHT: 179 LBS | HEIGHT: 64 IN

## 2018-07-23 DIAGNOSIS — Z98.890 S/P ARTHROSCOPY OF SHOULDER: ICD-10-CM

## 2018-07-23 DIAGNOSIS — M25.511 RIGHT SHOULDER PAIN, UNSPECIFIED CHRONICITY: Primary | ICD-10-CM

## 2018-07-23 PROCEDURE — 97110 THERAPEUTIC EXERCISES: CPT | Performed by: PHYSICAL THERAPIST

## 2018-07-23 PROCEDURE — 99024 POSTOP FOLLOW-UP VISIT: CPT | Performed by: PHYSICIAN ASSISTANT

## 2018-07-23 PROCEDURE — 97140 MANUAL THERAPY 1/> REGIONS: CPT | Performed by: PHYSICAL THERAPIST

## 2018-07-23 PROCEDURE — G0283 ELEC STIM OTHER THAN WOUND: HCPCS | Performed by: PHYSICAL THERAPIST

## 2018-07-23 NOTE — PROGRESS NOTES
Chief Complaint   Patient presents with    Post-Op Check     Right shoulder sx 6/26/2018-[facility]     History of present illness: The patient returns today after right shoulder arthroscopy performed on 6/26/2018 with capsulorrhaphy. Pain control has been satisfactory with oral medications. She has been progressing well with physical therapy. She works as a  and has to use her right hand to pull the break. Physical examination: The patient displays improving range of motion. She is able to perform active abduction to approximately 100°. Passively, she can bring his arm to approximately 125°. Rotator cuff strength is 5/5 with supraspinatus, infraspinatus, and subscapularis testing. Incisions are well-healed with no signs of infection. Neurovascular exam is intact. X-rays: 2 x-ray views of the right shoulder were taken today and reveal evidence of NEER acromioplasty and Smiley procedure    Assessment/plan: The patient is doing well after shoulder arthroscopy. I have recommended continued therapy for both range of motion and strength. They may begin more normal activities of daily living. He is going to continue working with physical therapy and we will see her back again in one month. We will discuss her ability to return to work at that time depending on how her therapy progresses. Jose Raul Pimentel, [facility]    This dictation was performed with a verbal recognition program [facility]) and it was checked for errors. It is possible that there are still dictated errors within this office note. If so, please bring any errors to my attention for an addendum. All efforts were made to ensure that this office note is accurate.

## 2018-07-23 NOTE — FLOWSHEET NOTE
flexibility, endurance, ROM of scapular, scapulothoracic and UE control with self care, reaching, carrying, lifting, house/yardwork, driving/computer work  [] (04608) Reviewed/Progressed HEP activities related to improving balance, coordination, kinesthetic sense, posture, motor skill, proprioception of scapular, scapulothoracic and UE control with self care, reaching, carrying, lifting, house/yardwork, driving/computer work      Manual Treatments:  PROM / STM / Oscillations-Mobs:  G-I, II, III, IV (PA's, Inf., Post.)  [x] (30124) Provided manual therapy to mobilize soft tissue/joints of cervical/CT, scapular GHJ and UE for the purpose of modulating pain, promoting relaxation,  increasing ROM, reducing/eliminating soft tissue swelling/inflammation/restriction, improving soft tissue extensibility and allowing for proper ROM for normal function with self care, reaching, carrying, lifting, house/yardwork, driving/computer work    Modalities:  PM/CP x15' R shoulder 11:30-11:45    Charges:  Timed Code Treatment Minutes: 60   Total Treatment Minutes: 75     [] EVAL (LOW) 70308 (typically 20 minutes face-to-face)  [] EVAL (MOD) 80777 (typically 30 minutes face-to-face)  [] EVAL (HIGH) 67872 (typically 45 minutes face-to-face)  [] RE-EVAL     [x] (05042) x  3 10:50-11:30 [] IONTO  [] NMR (35229) x      [] VASO  [x] Manual (34546) x  1   10:30-10:50 [] Other:  [] TA x       [] Mech Traction (33669)  [] ES(attended) (88334)      [x] ES (un) (43363):     Sisi Hill stated goal: able to resume full duty work    Therapist goals for Patient:   Short Term Goals: To be achieved in: 2 weeks  1. Independent in HEP and progression per patient tolerance, in order to prevent re-injury. MET  2. Patient will have a decrease in pain to facilitate improvement in movement, function, and ADLs as indicated by Functional Deficits. PROGRESSING    Long Term Goals: To be achieved in: 8 weeks  1.  Disability index score of 30 % or less for the [facility] to assist with reaching prior level of function. 2. Patient will demonstrate increased AROM to [facility] R shoulder to allow for proper joint functioning as indicated by patients Functional Deficits. 3. Patient will demonstrate an increase in Strength to grossly 4/5 R shoulder to allow for proper functional mobility as indicated by patients Functional Deficits. 4. Patient will return to overhead reaching functional activities without increased symptoms or restriction. 5. Pt will be able to resume full duty at work. Progression Towards Functional goals:  [x] Patient is progressing as expected towards functional goals listed. [x] Progression is slowed due to complexities listed. ---NATALIYA  [] Progression has been slowed due to co-morbidities. [] Plan just implemented, too soon to assess goals progression  [] Other:     ASSESSMENT:  Good improvements noted with overhead motion today. Continued tightness w/ IR AROM and general shoulder fatigue with progressions. Continued posture cuing with progressions.      Treatment/Activity Tolerance:  [x] Patient tolerated treatment well [] Patient limited by fatique  [] Patient limited by pain  [] Patient limited by other medical complications  [] Other:     Prognosis: [x] Good [] Fair  [] Poor    Patient Requires Follow-up: [x] Yes  [] No    PLAN: See eval  [x] Continue per plan of care [] Alter current plan (see comments)  [] Plan of care initiated [] Hold pending MD visit [] Discharge    Electronically signed by: Hernandez Lopez, PT

## 2018-07-25 ENCOUNTER — HOSPITAL ENCOUNTER (OUTPATIENT)
Dept: PHYSICAL THERAPY | Age: 43
Setting detail: THERAPIES SERIES
Discharge: HOME | End: 2018-07-25
Payer: COMMERCIAL

## 2018-07-25 PROCEDURE — 97110 THERAPEUTIC EXERCISES: CPT | Performed by: PHYSICAL THERAPIST

## 2018-07-25 PROCEDURE — G0283 ELEC STIM OTHER THAN WOUND: HCPCS | Performed by: PHYSICAL THERAPIST

## 2018-07-25 PROCEDURE — 97140 MANUAL THERAPY 1/> REGIONS: CPT | Performed by: PHYSICAL THERAPIST

## 2018-07-25 NOTE — FLOWSHEET NOTE
Reviewed/Progressed HEP activities related to strengthening, flexibility, endurance, ROM of scapular, scapulothoracic and UE control with self care, reaching, carrying, lifting, house/yardwork, driving/computer work  [] (35592) Reviewed/Progressed HEP activities related to improving balance, coordination, kinesthetic sense, posture, motor skill, proprioception of scapular, scapulothoracic and UE control with self care, reaching, carrying, lifting, house/yardwork, driving/computer work      Manual Treatments:  PROM / STM / Oscillations-Mobs:  G-I, II, III, IV (PA's, Inf., Post.)  [x] (90245) Provided manual therapy to mobilize soft tissue/joints of cervical/CT, scapular GHJ and UE for the purpose of modulating pain, promoting relaxation,  increasing ROM, reducing/eliminating soft tissue swelling/inflammation/restriction, improving soft tissue extensibility and allowing for proper ROM for normal function with self care, reaching, carrying, lifting, house/yardwork, driving/computer work    Modalities:  PM/CP x15' R shoulder 8:55-9:10    Charges:  Timed Code Treatment Minutes: 55   Total Treatment Minutes: 70     [] EVAL (LOW) 67104 (typically 20 minutes face-to-face)  [] EVAL (MOD) 34497 (typically 30 minutes face-to-face)  [] EVAL (HIGH) 17194 (typically 45 minutes face-to-face)  [] RE-EVAL     [x] BT(61772) x  3 8:20-8:55 [] IONTO  [] NMR (02051) x      [] VASO  [x] Manual (78893) x  1   8:00-8:20 [] Other:  [] TA x       [] Mech Traction (88332)  [] ES(attended) (64414)      [x] ES (un) (47787):     Ceballos Lansing stated goal: able to resume full duty work    Therapist goals for Patient:   Short Term Goals: To be achieved in: 2 weeks  1. Independent in HEP and progression per patient tolerance, in order to prevent re-injury. MET  2. Patient will have a decrease in pain to facilitate improvement in movement, function, and ADLs as indicated by Functional Deficits. PROGRESSING    Long Term Goals:  To be achieved in: 8 weeks  1. Disability index score of 30 % or less for the [facility] to assist with reaching prior level of function. 2. Patient will demonstrate increased AROM to [facility] R shoulder to allow for proper joint functioning as indicated by patients Functional Deficits. 3. Patient will demonstrate an increase in Strength to grossly 4/5 R shoulder to allow for proper functional mobility as indicated by patients Functional Deficits. 4. Patient will return to overhead reaching functional activities without increased symptoms or restriction. 5. Pt will be able to resume full duty at work. Progression Towards Functional goals:  [x] Patient is progressing as expected towards functional goals listed. [x] Progression is slowed due to complexities listed. ---NATALIYA  [] Progression has been slowed due to co-morbidities. [] Plan just implemented, too soon to assess goals progression  [] Other:     ASSESSMENT:  Decreased tightness and soreness following session. Continued general shoulder weakness with MMT.      Treatment/Activity Tolerance:  [x] Patient tolerated treatment well [] Patient limited by fatique  [] Patient limited by pain  [] Patient limited by other medical complications  [] Other:     Prognosis: [x] Good [] Fair  [] Poor    Patient Requires Follow-up: [x] Yes  [] No    PLAN: See eval  [x] Continue per plan of care [] Alter current plan (see comments)  [] Plan of care initiated [] Hold pending MD visit [] Discharge    Electronically signed by: Beatrice Milian PT

## 2018-07-26 ENCOUNTER — HOSPITAL ENCOUNTER (OUTPATIENT)
Dept: PHYSICAL THERAPY | Age: 43
Setting detail: THERAPIES SERIES
Discharge: HOME | End: 2018-07-26
Payer: COMMERCIAL

## 2018-07-26 PROCEDURE — 97140 MANUAL THERAPY 1/> REGIONS: CPT | Performed by: PHYSICAL THERAPIST

## 2018-07-26 PROCEDURE — G0283 ELEC STIM OTHER THAN WOUND: HCPCS | Performed by: PHYSICAL THERAPIST

## 2018-07-26 PROCEDURE — 97110 THERAPEUTIC EXERCISES: CPT | Performed by: PHYSICAL THERAPIST

## 2018-07-26 NOTE — FLOWSHEET NOTE
[facility], [address] [place] [address] Artur  Phone: [phone]  Fax [phone]      Physical Therapy Daily Treatment Note  Date:  2018    Patient Name:  Burton Barfield    :  1975  MRN: [id number]  Restrictions/Precautions:    Medical/Treatment Diagnosis Information:  · Diagnosis: M75.01 (ICD-10-CM) - Adhesive bursitis of right shoulder s/p ascope 18 debridement, NATALIYA, Neer  · Treatment Diagnosis: M75.01 (ICD-10-CM) - Adhesive bursitis of right shoulder  Insurance/Certification information:  PT Insurance Information: [facility] 12 visits through 18  Physician Information:  Referring Practitioner: Bessy Feldman of care signed (Y/N):      Date of Patient follow up with Physician:     G-Code (if applicable):      Date G-Code Applied:  18  PT G-Codes  Functional Assessment Tool Used: Qdash  Score: 93%  Functional Limitation: Carrying, moving and handling objects  Carrying, Moving and Handling Objects Current Status (): At least 80 percent but less than 100 percent impaired, limited or restricted  Carrying, Moving and Handling Objects Goal Status (): At least 20 percent but less than 40 percent impaired, limited or restricted    Progress Note: []  Yes  []  No  Next due by: Visit #10      Latex Allergy:  [x]NO      []YES  Preferred Language for Healthcare:   [x]English       []other:    Visit # Insurance Allowable Requires auth    12 expires 18    []no        [x]yes:     Pain level: NT/10     SUBJECTIVE: Pt reports she is sore top of shoulder today. Had to take a pain pill.     OBJECTIVE:   Observation:   Test measurements:  ;    RESTRICTIONS/PRECAUTIONS: Hx DVT    Exercises/Interventions:   Therapeutic Ex Sets/rep comments   Shoulder shrugs, scap sets  HEP   Seated cane ER S  Supine cane ER S  HEP   Table slides flexion  Table ER S HEP   pendulums HEP   AROM biceps, wrist, hand HEP   UT S HEP   Semi prone standing end of table ext scap cues   SL ER         Brandt's flexion / scaption x25    Cage flexion squat  Rotation S  IR S 10\"x10    TB alternating row  Scap/UT cues R   Finisher W  Circles cw/ccw     Doorway stretch R UE  For HEP   Modified IR strap, gentle  TB IR S    HEP   Wall slide w/ triceps S  Pillow case   Belt IR S across, up 5x10\" ea    TB IR pull behind back  TB IR, ER  TB fwd punch  TB low trap pull  Single arm row   RTT 2x10 ea  Green TT 3x10 R  RTT 2x10 R  Green TT 3x10 R    1/2 wall no money (6\" box) YTB x10    1/2 wall lateral slides (6\" box) x15         ATC      Pt/daughter ed: POC, HEP, activity mod and sling weaning, icing, posture     Manual Intervention     PROM R shoulder all planes, oscillations, gr 3-4 post/inf GH mobs, SL scap mobs, STM pecs, rhomboids, post shoulder, subscap x20'                                NMR re-education                                                 Therapeutic Exercise and NMR EXR  [x] (05288) Provided verbal/tactile cueing for activities related to strengthening, flexibility, endurance, ROM  for improvements in scapular, scapulothoracic and UE control with self care, reaching, carrying, lifting, house/yardwork, driving/computer work. [x] (13707) Provided verbal/tactile cueing for activities related to improving balance, coordination, kinesthetic sense, posture, motor skill, proprioception  to assist with  scapular, scapulothoracic and UE control with self care, reaching, carrying, lifting, house/yardwork, driving/computer work. Therapeutic Activities:    [] (17559 or 30054) Provided verbal/tactile cueing for activities related to improving balance, coordination, kinesthetic sense, posture, motor skill, proprioception and motor activation to allow for proper function of scapular, scapulothoracic and UE control with self care, carrying, lifting, driving/computer work.      Home Exercise Program:    [x] (06161) Reviewed/Progressed HEP activities related to strengthening, flexibility, endurance, ROM of scapular, scapulothoracic and UE control with self care, reaching, carrying, lifting, house/yardwork, driving/computer work  [] (82692) Reviewed/Progressed HEP activities related to improving balance, coordination, kinesthetic sense, posture, motor skill, proprioception of scapular, scapulothoracic and UE control with self care, reaching, carrying, lifting, house/yardwork, driving/computer work      Manual Treatments:  PROM / STM / Oscillations-Mobs:  G-I, II, III, IV (PA's, Inf., Post.)  [x] (55267) Provided manual therapy to mobilize soft tissue/joints of cervical/CT, scapular GHJ and UE for the purpose of modulating pain, promoting relaxation,  increasing ROM, reducing/eliminating soft tissue swelling/inflammation/restriction, improving soft tissue extensibility and allowing for proper ROM for normal function with self care, reaching, carrying, lifting, house/yardwork, driving/computer work    Modalities:  PM/CP x15' R shoulder 9:05-9:20    Charges:  Timed Code Treatment Minutes: 55   Total Treatment Minutes: 70     [] EVAL (LOW) 32962 (typically 20 minutes face-to-face)  [] EVAL (MOD) 77222 (typically 30 minutes face-to-face)  [] EVAL (HIGH) 75730 (typically 45 minutes face-to-face)  [] RE-EVAL     [x] DZ(80159) x  3 8:30-9:05 [] IONTO  [] NMR (43559) x      [] VASO  [x] Manual (25724) x  1   8:10-8:30 [] Other:  [] TA x       [] [facility] (31764)  [] ES(attended) (05842)      [x] ES (un) (03537):     Mary Covington stated goal: able to resume full duty work    Therapist goals for Patient:   Short Term Goals: To be achieved in: 2 weeks  1. Independent in HEP and progression per patient tolerance, in order to prevent re-injury. MET  2. Patient will have a decrease in pain to facilitate improvement in movement, function, and ADLs as indicated by Functional Deficits. PROGRESSING    Long Term Goals: To be achieved in: 8 weeks  1.  Disability index score of 30 % or

## 2018-07-30 ENCOUNTER — HOSPITAL ENCOUNTER (OUTPATIENT)
Dept: PHYSICAL THERAPY | Age: 43
Setting detail: THERAPIES SERIES
Discharge: HOME | End: 2018-07-30
Payer: COMMERCIAL

## 2018-07-30 NOTE — FLOWSHEET NOTE
[facility], [address]      Physical Therapy  Cancellation/No-show Note  Patient Name:  Duke Kaminski  :  1975   Date:  2018  Cancelled visits to date: 5  No-shows to date: 0    For today's appointment patient:  [x]  Cancelled  []  Rescheduled appointment  []  No-show     Reason given by patient:  []  Patient ill  []  Conflicting appointment  []  No transportation    []  Conflict with work  []  No reason given  []  Other:     Comments:  Waiting on date extension for insurance 18    Electronically signed by:  Zach Armenta PT

## 2018-08-01 ENCOUNTER — HOSPITAL ENCOUNTER (OUTPATIENT)
Dept: PHYSICAL THERAPY | Age: 43
Setting detail: THERAPIES SERIES
Discharge: HOME | End: 2018-08-01
Payer: COMMERCIAL

## 2018-08-01 PROCEDURE — 97110 THERAPEUTIC EXERCISES: CPT | Performed by: PHYSICAL THERAPIST

## 2018-08-01 PROCEDURE — G8985 CARRY GOAL STATUS: HCPCS | Performed by: PHYSICAL THERAPIST

## 2018-08-01 PROCEDURE — G8984 CARRY CURRENT STATUS: HCPCS | Performed by: PHYSICAL THERAPIST

## 2018-08-01 PROCEDURE — G0283 ELEC STIM OTHER THAN WOUND: HCPCS | Performed by: PHYSICAL THERAPIST

## 2018-08-01 PROCEDURE — 97140 MANUAL THERAPY 1/> REGIONS: CPT | Performed by: PHYSICAL THERAPIST

## 2018-08-01 NOTE — PLAN OF CARE
[facility], [address] [place] [address]  Phone: [phone]  Fax [phone]     Physical Therapy Re-Certification Plan of Care    Dear  ,    We had the pleasure of treating the following patient for physical therapy services at [address] [place] [address]. A summary of our findings can be found in the updated assessment below. This includes our plan of care. If you have any questions or concerns regarding these findings, please do not hesitate to contact me at the office phone number checked above. Thank you for the referral.     Physician Signature:________________________________Date:__________________  By signing above, therapists plan is approved by physician      Patient: Severino Vegas   : 1975   MRN: [id number]  Referring Physician:        Evaluation Date: 2018      Medical/Treatment Diagnosis Information:  · Diagnosis: M75.01 (ICD-10-CM) - Adhesive bursitis of right shoulder s/p ascope 18 debridement, Sher AN  · Treatment Diagnosis: M75.01 (ICD-10-CM) - Adhesive bursitis of right shoulder  Insurance/Certification information:  PT Insurance Information: [facility]  Date Range:18-  Total visits:10      G-Codes: (if applicable) PT G-Codes  Functional Assessment Tool Used: Qdash  Score: 55%  Functional Limitation: Carrying, moving and handling objects  Carrying, Moving and Handling Objects Current Status (): At least 40 percent but less than 60 percent impaired, limited or restricted  Carrying, Moving and Handling Objects Goal Status (): At least 20 percent but less than 40 percent impaired, limited or restricted   Functional Index used:    SUBJECTIVE: Pt reports her R arm feels weak, but she is able to use it more with ADL's and the stretching feels like it is getting easier.     Current Pain Scale: 5/10    Type: [x]Constant   []Intermitment achieved by 8/31/18: all goals in progress 8/1/18  1. Disability index score of 30 % or less for the [facility] to assist with reaching prior level of function. 2. Patient will demonstrate increased AROM to [facility] R shoulder to allow for proper joint functioning as indicated by patients Functional Deficits. 3. Patient will demonstrate an increase in Strength to grossly 4/5 R shoulder to allow for proper functional mobility as indicated by patients Functional Deficits. 4. Patient will return to overhead reaching functional activities without increased symptoms or restriction. 5. Pt will be able to resume full duty at work. Rehab Potential:   []Excellent   [] Good   [] Fair   [] Poor    Plan of Care:  [] Continue Current Therapy Intervention    Frequency/Duration:  2-3 days per week for 6 Weeks:  HEP instruction: yes  1. Therapeutic exercise including: strength training, ROM, NMR and proprioception for the scapula, core and Upper extremity  2. Manual therapy as indicated including Dry Needling/IASTM, STM, PROM, Gr I-IV mobilizations, spinal mobilization/manipulation. 3. Modalities as needed including: thermal agents, E-stim, US, iontophoresis as indicated. 4. Patient education on joint protection, activity modification, progression of HEP.        Electronically signed by:  Griffith Cranker, PT

## 2018-08-02 ENCOUNTER — HOSPITAL ENCOUNTER (OUTPATIENT)
Dept: PHYSICAL THERAPY | Age: 43
Setting detail: THERAPIES SERIES
Discharge: HOME | End: 2018-08-02
Payer: COMMERCIAL

## 2018-08-02 PROCEDURE — G0283 ELEC STIM OTHER THAN WOUND: HCPCS | Performed by: PHYSICAL THERAPIST

## 2018-08-02 PROCEDURE — 97110 THERAPEUTIC EXERCISES: CPT | Performed by: PHYSICAL THERAPIST

## 2018-08-02 PROCEDURE — 97140 MANUAL THERAPY 1/> REGIONS: CPT | Performed by: PHYSICAL THERAPIST

## 2018-08-02 NOTE — FLOWSHEET NOTE
[facility], [address] [place] [address]  Phone: [phone]  Fax [phone]      Physical Therapy Daily Treatment Note  Date:  2018    Patient Name:  Allan Ronquillo    :  1975  MRN: [id number]  Restrictions/Precautions:    Medical/Treatment Diagnosis Information:  · Diagnosis: M75.01 (ICD-10-CM) - Adhesive bursitis of right shoulder s/p ascope 18 debridement, NATALIYA, Neer  · Treatment Diagnosis: M75.01 (ICD-10-CM) - Adhesive bursitis of right shoulder  Insurance/Certification information:  PT Insurance Information: [id number] 12 visits through 18  Physician Information:  Referring Practitioner: Kosta Awan of care signed (Y/N):      Date of Patient follow up with Physician:     G-Code (if applicable):  85% 33    Date G-Code Applied:  18  PT G-Codes  Functional Assessment Tool Used: Qdash  Score: 93%  Functional Limitation: Carrying, moving and handling objects  Carrying, Moving and Handling Objects Current Status (): At least 80 percent but less than 100 percent impaired, limited or restricted  Carrying, Moving and Handling Objects Goal Status (): At least 20 percent but less than 40 percent impaired, limited or restricted    Progress Note: []  Yes  []  No  Next due by: Visit #20  Or 18    Latex Allergy:  [x]NO      []YES  Preferred Language for Healthcare:   [x]English       []other:    Visit # Insurance Allowable Requires auth    12 expires 18    []no        [x]yes:     Pain level: 5/10     SUBJECTIVE: Pt reports she feels muscle sore from PT yesterday.      OBJECTIVE:   Observation:   Test measurements:    RESTRICTIONS/PRECAUTIONS: Hx DVT    Exercises/Interventions:   Therapeutic Ex Sets/rep comments   Shoulder shrugs, scap sets  HEP   Seated cane ER S  Supine cane ER S  HEP   Table slides flexion  Table ER S HEP   pendulums HEP   AROM biceps, wrist, hand HEP   UT S HEP   Semi prone standing end of table ext [id number] cues   SL ER    Supine flexion x10         SB roll flex, scap circles on table x10 ea    Pulley's flexion / scaption     Cage flexion squat  Rotation S  IR S     TB alternating row  Scap/UT cues R   Finisher W  Circles cw/ccw     Doorway stretch R UE  For HEP   Modified IR strap, gentle  TB IR S    HEP   Wall slide w/ triceps S  Pillow case   Belt IR S across, up     TB IR pull behind back  TB IR, ER  TB fwd punch  TB low trap pull  Single arm row  Assisted flexion          New TB HEP H.O. And bands provided 8/1/18   1/2 wall no money (6\" box) YTB 2x10    1/2 wall lateral slides (6\" box) x15    Bicep curls  Weight fwd press B hand hold 2# x20  2# x20     Wall slide with ecc lower 2x5 Very fatiguing        ATC      Pt/daughter ed: POC, HEP, activity mod and sling weaning, icing, posture     Manual Intervention     PROM R shoulder all planes, oscillations, gr 3-4 post/inf GH mobs, SL scap mobs, STM pecs, rhomboids, post shoulder, subscap x20'                                NMR re-education                                                 Therapeutic Exercise and NMR EXR  [x] (14938) Provided verbal/tactile cueing for activities related to strengthening, flexibility, endurance, ROM  for improvements in scapular, scapulothoracic and UE control with self care, reaching, carrying, lifting, house/yardwork, driving/computer work. [x] (36501) Provided verbal/tactile cueing for activities related to improving balance, coordination, kinesthetic sense, posture, motor skill, proprioception  to assist with  scapular, scapulothoracic and UE control with self care, reaching, carrying, lifting, house/yardwork, driving/computer work.     Therapeutic Activities:    [] (55441 or 49106) Provided verbal/tactile cueing for activities related to improving balance, coordination, kinesthetic sense, posture, motor skill, proprioception and motor activation to allow for proper function of scapular, scapulothoracic and UE control with self care, carrying, lifting, driving/computer work. Home Exercise Program:    [x] (51776) Reviewed/Progressed HEP activities related to strengthening, flexibility, endurance, ROM of scapular, scapulothoracic and UE control with self care, reaching, carrying, lifting, house/yardwork, driving/computer work  [] (43874) Reviewed/Progressed HEP activities related to improving balance, coordination, kinesthetic sense, posture, motor skill, proprioception of scapular, scapulothoracic and UE control with self care, reaching, carrying, lifting, house/yardwork, driving/computer work      Manual Treatments:  PROM / STM / Oscillations-Mobs:  G-I, II, III, IV (PA's, Inf., Post.)  [x] (81157) Provided manual therapy to mobilize soft tissue/joints of cervical/CT, scapular GHJ and UE for the purpose of modulating pain, promoting relaxation,  increasing ROM, reducing/eliminating soft tissue swelling/inflammation/restriction, improving soft tissue extensibility and allowing for proper ROM for normal function with self care, reaching, carrying, lifting, house/yardwork, driving/computer work    Modalities:  PM/CP x15' R shoulder 8:55-9:10    Charges:  Timed Code Treatment Minutes: 55   Total Treatment Minutes: 70     [] EVAL (LOW) 44820 (typically 20 minutes face-to-face)  [] EVAL (MOD) 28109 (typically 30 minutes face-to-face)  [] EVAL (HIGH) 99263 (typically 45 minutes face-to-face)  [] RE-EVAL     [x] MX(36452) x  3 8:20-8:55 [] IONTO  [] NMR (24421) x      [] VASO  [x] Manual (25292) x  1   8:00-8:20 [] Other:  [] TA x       [] Mech Traction (76933)  [] ES(attended) (67747)      [x] ES (un) (67767):     Kelsie Chavarria stated goal: able to resume full duty work    Therapist goals for Patient:   Short Term Goals: To be achieved in: 2 weeks  1. Independent in HEP and progression per patient tolerance, in order to prevent re-injury. MET  2.  Patient will have a decrease in pain to facilitate

## 2018-08-03 ENCOUNTER — TELEPHONE (OUTPATIENT)
Dept: ORTHOPEDIC SURGERY | Age: 43
End: 2018-08-03

## 2018-08-06 ENCOUNTER — HOSPITAL ENCOUNTER (OUTPATIENT)
Dept: PHYSICAL THERAPY | Age: 43
Setting detail: THERAPIES SERIES
Discharge: HOME | End: 2018-08-06
Payer: COMMERCIAL

## 2018-08-06 PROCEDURE — 97140 MANUAL THERAPY 1/> REGIONS: CPT | Performed by: PHYSICAL THERAPIST

## 2018-08-06 PROCEDURE — 97110 THERAPEUTIC EXERCISES: CPT | Performed by: PHYSICAL THERAPIST

## 2018-08-06 NOTE — FLOWSHEET NOTE
[facility], [address] [facility]  Phone: [phone]  Fax [phone]      Physical Therapy Daily Treatment Note  Date:  2018    Patient Name:  Emperatriz Ritter    :  1975  MRN: [id number]  Restrictions/Precautions:    Medical/Treatment Diagnosis Information:  · Diagnosis: M75.01 (ICD-10-CM) - Adhesive bursitis of right shoulder s/p ascope 18 debridement, NATALIYA, Neer  · Treatment Diagnosis: M75.01 (ICD-10-CM) - Adhesive bursitis of right shoulder  Insurance/Certification information:  PT Insurance Information: [facility] 12 visits through 18  Physician Information:  Referring Practitioner: Yesy Maldonado of care signed (Y/N):      Date of Patient follow up with Physician:     G-Code (if applicable):  43% 25    Date G-Code Applied:  18  PT G-Codes  Functional Assessment Tool Used: Qdash  Score: 93%  Functional Limitation: Carrying, moving and handling objects  Carrying, Moving and Handling Objects Current Status (): At least 80 percent but less than 100 percent impaired, limited or restricted  Carrying, Moving and Handling Objects Goal Status (): At least 20 percent but less than 40 percent impaired, limited or restricted    Progress Note: []  Yes  []  No  Next due by: Visit #20  Or 18    Latex Allergy:  [x]NO      []YES  Preferred Language for Healthcare:   [x]English       []other:    Visit # Insurance Allowable Requires auth    12 expires 18    []no        [x]yes:     Pain level: NT/10     SUBJECTIVE: Pt reports she has had a migraine the last 3 days. She has stretched her arm a lot at home, but not done much with the TB.     OBJECTIVE:   Observation:   Test measurements:   ER 65  RESTRICTIONS/PRECAUTIONS: Hx DVT    Exercises/Interventions:   Therapeutic Ex Sets/rep comments   Shoulder shrugs, scap sets  HEP   Seated cane ER S  Supine cane ER S  HEP   Table slides flexion  Table ER S HEP   pendulums HEP   AROM biceps, wrist, hand HEP   UT S HEP   Semi prone standing end of table row, ext 2# 2x10 eascap cues   SL ER    Supine flexion          SB roll flex, scap circles on table     Pulley's flexion / scaption     Cage flexion squat  Rotation S  IR S     TB alternating row  Scap/UT cues R   Finisher W  Circles cw/ccw     Doorway stretch R UE  For HEP   Modified IR strap, gentle  TB IR S    HEP   Wall slide w/ triceps S  Pillow case   Belt IR S , up 5x10\" ea    TB IR pull behind back  TB IR, ER  TB fwd punch  TB low trap pull  Single arm row  Assisted flexion   Green TT 3x10   Green TT 3x10 R  Green TT 3x10 R   New TB HEP H.O. And bands provided 8/1/18   1/2 wall no money (6\" box)    1/2 wall lateral slides (6\" box)    Bicep curls  Weight fwd press B hand hold 2# x30  2# x30     Wall slide with ecc lower 2x10         ATC      Pt/daughter ed: POC, HEP, activity mod and sling weaning, icing, posture     Manual Intervention     PROM R shoulder all planes, oscillations, gr 3-4 post/inf GH mobs, SL scap mobs, STM , , post shoulder, subscap x18'                                NMR re-education                                                 Therapeutic Exercise and NMR EXR  [x] (46462) Provided verbal/tactile cueing for activities related to strengthening, flexibility, endurance, ROM  for improvements in scapular, scapulothoracic and UE control with self care, reaching, carrying, lifting, house/yardwork, driving/computer work. [x] (84331) Provided verbal/tactile cueing for activities related to improving balance, coordination, kinesthetic sense, posture, motor skill, proprioception  to assist with  scapular, scapulothoracic and UE control with self care, reaching, carrying, lifting, house/yardwork, driving/computer work.     Therapeutic Activities:    [] (80352 or 31325) Provided verbal/tactile cueing for activities related to improving balance, coordination, kinesthetic sense, posture, motor

## 2018-08-07 ENCOUNTER — APPOINTMENT (OUTPATIENT)
Dept: PHYSICAL THERAPY | Age: 43
End: 2018-08-07
Payer: COMMERCIAL

## 2018-08-08 ENCOUNTER — APPOINTMENT (OUTPATIENT)
Dept: PHYSICAL THERAPY | Age: 43
End: 2018-08-08
Payer: COMMERCIAL

## 2018-08-09 ENCOUNTER — HOSPITAL ENCOUNTER (OUTPATIENT)
Dept: PHYSICAL THERAPY | Age: 43
Setting detail: THERAPIES SERIES
Discharge: HOME | End: 2018-08-09
Payer: COMMERCIAL

## 2018-08-09 PROCEDURE — 97140 MANUAL THERAPY 1/> REGIONS: CPT | Performed by: PHYSICAL THERAPIST

## 2018-08-09 PROCEDURE — G0283 ELEC STIM OTHER THAN WOUND: HCPCS | Performed by: PHYSICAL THERAPIST

## 2018-08-09 PROCEDURE — 97110 THERAPEUTIC EXERCISES: CPT | Performed by: PHYSICAL THERAPIST

## 2018-08-09 NOTE — FLOWSHEET NOTE
[facility], [address]  Phone: [phone]  Fax [phone]      Physical Therapy Daily Treatment Note  Date:  2018    Patient Name:  Melba Mays    :  1975  MRN: [id number]  Restrictions/Precautions:    Medical/Treatment Diagnosis Information:  · Diagnosis: M75.01 (ICD-10-CM) - Adhesive bursitis of right shoulder s/p ascope 18 debridement, NATALIYA, Neer  · Treatment Diagnosis: M75.01 (ICD-10-CM) - Adhesive bursitis of right shoulder  Insurance/Certification information:  PT Insurance Information: [facility] 12 visits through 18  Physician Information:  Referring Practitioner: Sumit Niño of care signed (Y/N):      Date of Patient follow up with Physician: 18    G-Code (if applicable):  83%     Date G-Code Applied:  18  PT G-Codes  Functional Assessment Tool Used: Qdash  Score: 93%  Functional Limitation: Carrying, moving and handling objects  Carrying, Moving and Handling Objects Current Status (): At least 80 percent but less than 100 percent impaired, limited or restricted  Carrying, Moving and Handling Objects Goal Status (): At least 20 percent but less than 40 percent impaired, limited or restricted    Progress Note: []  Yes  []  No  Next due by: Visit #20  Or 18    Latex Allergy:  [x]NO      []YES  Preferred Language for Healthcare:   [x]English       []other:    Visit # Insurance Allowable Requires [facility]     1/? 12 expires 18    []no        [x]yes:     Pain level: 5/10     SUBJECTIVE: Pt reports her shoulder still feels tight in the back. She has been trying to find ways to stretch it out at home. Pain level stays about the same and she still will take a pain pill a few times a week.     OBJECTIVE:   Observation:   Test measurements:   ER 65  RESTRICTIONS/PRECAUTIONS: Hx DVT    Exercises/Interventions:   Therapeutic Ex Sets/rep comments   Shoulder shrugs, scap sets  HEP   Seated cane ER S  Supine cane ER S  HEP   Table slides flexion  Table ER S HEP   pendulums HEP   AROM biceps, wrist, hand HEP   UT S HEP   Semi prone standing end of table row, ext 2# 2x10 eascap cues   SL ER    Supine flexion     Sleeper S 5x30\"         SB roll flex, scap circles on table     Pulley's flexion / scaption     Cage flexion squat  Rotation S  IR S     TB alternating row  Scap/UT cues R   Finisher W  Circles cw/ccw     Doorway stretch R UE  For HEP   Modified IR strap, gentle  TB IR S    HEP   Wall slide w/ triceps S 5x20\" Pillow case   Belt IR S , up     TB IR pull behind back  TB IR, ER  TB punch fwd, scap  TB low trap pull  Single arm row fwd, diagonal  Assisted flexion   Green TT 3x10   Green TT 2x10 ea  Green TT 2x15 ea   New TB HEP H.O. And bands provided 8/1/18   1/2 wall no money (6\" box)    1/2 wall lateral slides (6\" box)    Bicep curls  Weight fwd press B hand hold 3# x30  3# x30     Wall slide with ecc lower     Wall push ups 2x10         ATC      Pt/daughter ed: POC, HEP, activity mod and sling weaning, icing, posture     Manual Intervention     PROM R shoulder all planes, oscillations, gr 3-4 post/inf GH mobs, SL scap mobs, STM , , post shoulder, subscap x18'                                NMR re-education                                                 Therapeutic Exercise and NMR EXR  [x] (02408) Provided verbal/tactile cueing for activities related to strengthening, flexibility, endurance, ROM  for improvements in scapular, scapulothoracic and UE control with self care, reaching, carrying, lifting, house/yardwork, driving/computer work. [x] (35651) Provided verbal/tactile cueing for activities related to improving balance, coordination, kinesthetic sense, posture, motor skill, proprioception  to assist with  scapular, scapulothoracic and UE control with self care, reaching, carrying, lifting, house/yardwork, driving/computer work.     Therapeutic Activities:

## 2018-08-13 ENCOUNTER — APPOINTMENT (OUTPATIENT)
Dept: PHYSICAL THERAPY | Age: 43
End: 2018-08-13
Payer: COMMERCIAL

## 2018-08-13 ENCOUNTER — OFFICE VISIT (OUTPATIENT)
Dept: ORTHOPEDIC SURGERY | Age: 43
End: 2018-08-13

## 2018-08-13 VITALS — BODY MASS INDEX: 30.56 KG/M2 | WEIGHT: 179 LBS | HEIGHT: 64 IN

## 2018-08-13 DIAGNOSIS — S42.254A NONDISPLACED FRACTURE OF GREATER TUBEROSITY OF RIGHT HUMERUS, INITIAL ENCOUNTER FOR CLOSED FRACTURE: ICD-10-CM

## 2018-08-13 DIAGNOSIS — Z98.890 S/P ARTHROSCOPY OF SHOULDER: Primary | ICD-10-CM

## 2018-08-13 DIAGNOSIS — M75.01 ADHESIVE CAPSULITIS OF RIGHT SHOULDER: ICD-10-CM

## 2018-08-13 PROCEDURE — 99024 POSTOP FOLLOW-UP VISIT: CPT | Performed by: PHYSICIAN ASSISTANT

## 2018-08-13 NOTE — PROGRESS NOTES
Chief Complaint   Patient presents with    Post-Op Check     Right shoulder sx 6/26/2018-[facility]     History of present illness: The patient returns today after right shoulder arthroscopy with manipulation and capsulorrhaphy performed on 6/26/2018. They have progressed well with physical therapy. She works as a  and has to use her right arm to pull the break which is fairly forceful. Physical examination: The patient displays improving range of motion. Rotator cuff strength is 4/5 with supraspinatus, infraspinatus, and subscapularis testing. Incisions are well-healed with no signs of infection. Neurovascular exam is intact. Assessment/plan: The patient is doing well after shoulder arthroscopy. I have recommended continued therapy for both range of motion and strength. She is going to return to work with light duty restrictions. Her work has said that they do have a physician available for her where she will be working on the computer . She may begin more normal activities of daily living. We will see him back in one month for follow-up. Mitch Henriquez, [facility]    This dictation was performed with a verbal recognition program [facility] ) and it was checked for errors. It is possible that there are still dictated errors within this office note. If so, please bring any errors to my attention for an addendum. All efforts were made to ensure that this office note is accurate.

## 2018-08-15 ENCOUNTER — HOSPITAL ENCOUNTER (OUTPATIENT)
Dept: PHYSICAL THERAPY | Age: 43
Setting detail: THERAPIES SERIES
Discharge: HOME | End: 2018-08-15
Payer: COMMERCIAL

## 2018-08-16 ENCOUNTER — TELEPHONE (OUTPATIENT)
Dept: ORTHOPEDIC SURGERY | Age: 43
End: 2018-08-16

## 2018-08-16 ENCOUNTER — APPOINTMENT (OUTPATIENT)
Dept: PHYSICAL THERAPY | Age: 43
End: 2018-08-16
Payer: COMMERCIAL

## 2018-08-20 ENCOUNTER — HOSPITAL ENCOUNTER (OUTPATIENT)
Dept: PHYSICAL THERAPY | Age: 43
Setting detail: THERAPIES SERIES
Discharge: HOME | End: 2018-08-20
Payer: COMMERCIAL

## 2018-08-21 ENCOUNTER — TELEPHONE (OUTPATIENT)
Dept: ORTHOPEDIC SURGERY | Age: 43
End: 2018-08-21

## 2018-08-22 ENCOUNTER — HOSPITAL ENCOUNTER (OUTPATIENT)
Dept: PHYSICAL THERAPY | Age: 43
Setting detail: THERAPIES SERIES
Discharge: HOME | End: 2018-08-22
Payer: COMMERCIAL

## 2018-08-22 ENCOUNTER — APPOINTMENT (OUTPATIENT)
Dept: PHYSICAL THERAPY | Age: 43
End: 2018-08-22
Payer: COMMERCIAL

## 2018-08-22 PROCEDURE — 97110 THERAPEUTIC EXERCISES: CPT | Performed by: PHYSICAL THERAPIST

## 2018-08-22 PROCEDURE — G0283 ELEC STIM OTHER THAN WOUND: HCPCS | Performed by: PHYSICAL THERAPIST

## 2018-08-22 PROCEDURE — 97140 MANUAL THERAPY 1/> REGIONS: CPT | Performed by: PHYSICAL THERAPIST

## 2018-08-22 NOTE — FLOWSHEET NOTE
skill, proprioception  to assist with  scapular, scapulothoracic and UE control with self care, reaching, carrying, lifting, house/yardwork, driving/computer work. Therapeutic Activities:    [] (54635 or 76125) Provided verbal/tactile cueing for activities related to improving balance, coordination, kinesthetic sense, posture, motor skill, proprioception and motor activation to allow for proper function of scapular, scapulothoracic and UE control with self care, carrying, lifting, driving/computer work.      Home Exercise Program:    [x] (18859) Reviewed/Progressed HEP activities related to strengthening, flexibility, endurance, ROM of scapular, scapulothoracic and UE control with self care, reaching, carrying, lifting, house/yardwork, driving/computer work  [] (72702) Reviewed/Progressed HEP activities related to improving balance, coordination, kinesthetic sense, posture, motor skill, proprioception of scapular, scapulothoracic and UE control with self care, reaching, carrying, lifting, house/yardwork, driving/computer work      Manual Treatments:  PROM / STM / Oscillations-Mobs:  G-I, II, III, IV (PA's, Inf., Post.)  [x] (05971) Provided manual therapy to mobilize soft tissue/joints of cervical/CT, scapular GHJ and UE for the purpose of modulating pain, promoting relaxation,  increasing ROM, reducing/eliminating soft tissue swelling/inflammation/restriction, improving soft tissue extensibility and allowing for proper ROM for normal function with self care, reaching, carrying, lifting, house/yardwork, driving/computer work    Modalities:  PM/CP x15' R shoulder 12:45-1:00  Charges:  Timed Code Treatment Minutes: 75   Total Treatment Minutes: 90     [] EVAL (LOW) 60045 (typically 20 minutes face-to-face)  [] EVAL (MOD) 15735 (typically 30 minutes face-to-face)  [] EVAL (HIGH) 01607 (typically 45 minutes face-to-face)  [] RE-EVAL     [x] LQ(00411) x  3 11:48-12:45 [] IONTO  [] NMR (30098) x      [] VASO  [x] Manual

## 2018-08-23 ENCOUNTER — APPOINTMENT (OUTPATIENT)
Dept: PHYSICAL THERAPY | Age: 43
End: 2018-08-23
Payer: COMMERCIAL

## 2018-08-23 ENCOUNTER — HOSPITAL ENCOUNTER (OUTPATIENT)
Dept: PHYSICAL THERAPY | Age: 43
Setting detail: THERAPIES SERIES
Discharge: HOME | End: 2018-08-23
Payer: COMMERCIAL

## 2018-08-23 PROCEDURE — 97110 THERAPEUTIC EXERCISES: CPT | Performed by: PHYSICAL THERAPIST

## 2018-08-23 PROCEDURE — G0283 ELEC STIM OTHER THAN WOUND: HCPCS | Performed by: PHYSICAL THERAPIST

## 2018-08-23 PROCEDURE — 97140 MANUAL THERAPY 1/> REGIONS: CPT | Performed by: PHYSICAL THERAPIST

## 2018-08-23 NOTE — FLOWSHEET NOTE
Activities:    [] (26922 or 11765) Provided verbal/tactile cueing for activities related to improving balance, coordination, kinesthetic sense, posture, motor skill, proprioception and motor activation to allow for proper function of scapular, scapulothoracic and UE control with self care, carrying, lifting, driving/computer work.      Home Exercise Program:    [x] (32515) Reviewed/Progressed HEP activities related to strengthening, flexibility, endurance, ROM of scapular, scapulothoracic and UE control with self care, reaching, carrying, lifting, house/yardwork, driving/computer work  [] (88816) Reviewed/Progressed HEP activities related to improving balance, coordination, kinesthetic sense, posture, motor skill, proprioception of scapular, scapulothoracic and UE control with self care, reaching, carrying, lifting, house/yardwork, driving/computer work      Manual Treatments:  PROM / STM / Oscillations-Mobs:  G-I, II, III, IV (PA's, Inf., Post.)  [x] (93497) Provided manual therapy to mobilize soft tissue/joints of cervical/CT, scapular GHJ and UE for the purpose of modulating pain, promoting relaxation,  increasing ROM, reducing/eliminating soft tissue swelling/inflammation/restriction, improving soft tissue extensibility and allowing for proper ROM for normal function with self care, reaching, carrying, lifting, house/yardwork, driving/computer work    Modalities:  PM/CP x15' R shoulder 2:45-3:00  Charges:  Timed Code Treatment Minutes: 60   Total Treatment Minutes: 75     [] EVAL (LOW) 56961 (typically 20 minutes face-to-face)  [] EVAL (MOD) 11513 (typically 30 minutes face-to-face)  [] EVAL (HIGH) 85448 (typically 45 minutes face-to-face)  [] RE-EVAL     [x] IP(85280) x  3 2:00-2:45 [] IONTO  [] NMR (04927) x      [] VASO  [x] Manual (04058) x  1   1:45-2:00 [] Other:  [] TA x       [] Mech Traction (41937)  [] ES(attended) (44827)      [x] ES (un) (80832):     Krish Astudillo stated goal: able to resume full duty work    Therapist goals for Patient:   Short Term Goals: To be achieved in: 2 weeks  1. Independent in HEP and progression per patient tolerance, in order to prevent re-injury. MET  2. Patient will have a decrease in pain to facilitate improvement in movement, function, and ADLs as indicated by Functional Deficits. PROGRESSING    Long Term Goals: To be achieved by 8/31/18:-all goals in progress 8/1/18  1. Disability index score of 30 % or less for the [facility] to assist with reaching prior level of function. 2. Patient will demonstrate increased AROM to [facility] R shoulder to allow for proper joint functioning as indicated by patients Functional Deficits. 3. Patient will demonstrate an increase in Strength to [facility] 4/5 R shoulder to allow for proper functional mobility as indicated by patients Functional Deficits. 4. Patient will return to overhead reaching functional activities without increased symptoms or restriction. 5. Pt will be able to resume full duty at work. Progression Towards Functional goals:  [x] Patient is progressing as expected towards functional goals listed. [x] Progression is slowed due to complexities listed. ---NATALIYA  [] Progression has been slowed due to co-morbidities. [] Plan just implemented, too soon to assess goals progression  [] Other:     ASSESSMENT:  Increased tenderness with STM post shoulder this date. Good tolerance to addition of endurance work with body blade and ball on wall.     Treatment/Activity Tolerance:  [x] Patient tolerated treatment well [] Patient limited by fatique  [] Patient limited by pain  [] Patient limited by other medical complications  [] Other:     Prognosis: [x] Good [] Fair  [] Poor    Patient Requires Follow-up: [x] Yes  [] No    PLAN: Progress work simulated tasks  [x] Continue per plan of care [] Alter current plan (see comments)  [] Plan of care initiated [] Hold pending MD visit [] Discharge    Electronically signed by: Kelly Chambers

## 2018-08-27 ENCOUNTER — APPOINTMENT (OUTPATIENT)
Dept: PHYSICAL THERAPY | Age: 43
End: 2018-08-27
Payer: COMMERCIAL

## 2018-08-27 ENCOUNTER — HOSPITAL ENCOUNTER (OUTPATIENT)
Dept: PHYSICAL THERAPY | Age: 43
Setting detail: THERAPIES SERIES
Discharge: HOME | End: 2018-08-27
Payer: COMMERCIAL

## 2018-08-27 PROCEDURE — 97110 THERAPEUTIC EXERCISES: CPT | Performed by: PHYSICAL THERAPIST

## 2018-08-27 PROCEDURE — G0283 ELEC STIM OTHER THAN WOUND: HCPCS | Performed by: PHYSICAL THERAPIST

## 2018-08-27 PROCEDURE — 97140 MANUAL THERAPY 1/> REGIONS: CPT | Performed by: PHYSICAL THERAPIST

## 2018-08-28 ENCOUNTER — APPOINTMENT (OUTPATIENT)
Dept: PHYSICAL THERAPY | Age: 43
End: 2018-08-28
Payer: COMMERCIAL

## 2018-08-29 ENCOUNTER — APPOINTMENT (OUTPATIENT)
Dept: PHYSICAL THERAPY | Age: 43
End: 2018-08-29
Payer: COMMERCIAL

## 2018-08-30 ENCOUNTER — HOSPITAL ENCOUNTER (OUTPATIENT)
Dept: PHYSICAL THERAPY | Age: 43
Setting detail: THERAPIES SERIES
Discharge: HOME | End: 2018-08-30
Payer: COMMERCIAL

## 2018-08-30 ENCOUNTER — APPOINTMENT (OUTPATIENT)
Dept: PHYSICAL THERAPY | Age: 43
End: 2018-08-30
Payer: COMMERCIAL

## 2018-08-30 PROCEDURE — G0283 ELEC STIM OTHER THAN WOUND: HCPCS | Performed by: PHYSICAL THERAPIST

## 2018-08-30 PROCEDURE — 97140 MANUAL THERAPY 1/> REGIONS: CPT | Performed by: PHYSICAL THERAPIST

## 2018-08-30 PROCEDURE — 97110 THERAPEUTIC EXERCISES: CPT | Performed by: PHYSICAL THERAPIST

## 2018-08-30 NOTE — FLOWSHEET NOTE
HEP   pendulums HEP   AROM biceps, wrist, hand HEP   UT S HEP   Semi prone standing end of table row, ext  Standing bent over row to triceps ext     scap cues   SL ER    Supine flexion     Sleeper S     Hands behind head IR/ER S 10x10\"         SB roll flex, scap circles on table     Pulley's flexion / scaption     Cage flexion squat  Rotation S  IR S 10\"x10  x10  x10    TB alternating row  Scap/UT cues R   Finisher W  Circles cw/ccw     Doorway stretch R UE  For HEP   Modified IR strap, gentle  TB IR S    HEP   Wall slide w/ triceps S     Belt IR S , up  Added to HEP   TB IR pull behind back  TB IR, ER  TB punch fwd, scap  TB upward punch  Wall  punch  TB shoulder ext  TB low trap pull  Single arm row fwd, diagonal  Assisted flexion  PNF D1/D2 flexion  HAB     YTT 3x10      YTT 2x10 ea  YTT 2x10 New TB HEP H.O. And bands provided 8/1/18   1/2 wall no money (6\" box)    1/2 wall lateral slides (6\" box)    Bicep curls  Weight fwd/upward press B hand hold 4# 2x12       Wall slide with ecc lower     Wall push ups  HEP   Body blade IR/ER, fwd punch 3x15\" ea    Ball on wall 4 ways flex, scap x15 ea    Standing I, Y, T 1# 2x10 ea R/L         ATC      Pt/daughter ed:  HEP, ,  x3'    Manual Intervention     PROM R shoulder all planes, oscillations, gr 3-4 post/inf GH mobs, SL scap mobs, STM , , post shoulder, subscap x15' decreased Tenderness post shoulder                               NMR re-education                                                 Therapeutic Exercise and NMR EXR  [x] (36465) Provided verbal/tactile cueing for activities related to strengthening, flexibility, endurance, ROM  for improvements in scapular, scapulothoracic and UE control with self care, reaching, carrying, lifting, house/yardwork, driving/computer work.     [x] (40908) Provided verbal/tactile cueing for activities related to improving balance, coordination, kinesthetic sense, posture, motor skill, proprioception  to assist with scapular, scapulothoracic and UE control with self care, reaching, carrying, lifting, house/yardwork, driving/computer work. Therapeutic Activities:    [] (95715 or 01657) Provided verbal/tactile cueing for activities related to improving balance, coordination, kinesthetic sense, posture, motor skill, proprioception and motor activation to allow for proper function of scapular, scapulothoracic and UE control with self care, carrying, lifting, driving/computer work.      Home Exercise Program:    [x] (80831) Reviewed/Progressed HEP activities related to strengthening, flexibility, endurance, ROM of scapular, scapulothoracic and UE control with self care, reaching, carrying, lifting, house/yardwork, driving/computer work  [] (46148) Reviewed/Progressed HEP activities related to improving balance, coordination, kinesthetic sense, posture, motor skill, proprioception of scapular, scapulothoracic and UE control with self care, reaching, carrying, lifting, house/yardwork, driving/computer work      Manual Treatments:  PROM / STM / Oscillations-Mobs:  G-I, II, III, IV (PA's, Inf., Post.)  [x] (97652) Provided manual therapy to mobilize soft tissue/joints of cervical/CT, scapular GHJ and UE for the purpose of modulating pain, promoting relaxation,  increasing ROM, reducing/eliminating soft tissue swelling/inflammation/restriction, improving soft tissue extensibility and allowing for proper ROM for normal function with self care, reaching, carrying, lifting, house/yardwork, driving/computer work    Modalities:  PM/CP x15' R shoulder 4:00-4:15  Charges:  Timed Code Treatment Minutes: 60   Total Treatment Minutes: 75     [] EVAL (LOW) 58196 (typically 20 minutes face-to-face)  [] EVAL (MOD) 63163 (typically 30 minutes face-to-face)  [] EVAL (HIGH) 79226 (typically 45 minutes face-to-face)  [] RE-EVAL     [x] DI(09125) x  3 3:15-4:00 [] IONTO  [] NMR (11139) x      [] VASO  [x] Manual (07604) x  1   3:00-3:15 [] Other:  [] TA 2x/week  [x] Continue per plan of care [] Alter current plan (see comments)  [] Plan of care initiated [] Hold pending MD visit [] Discharge    Electronically signed by: Amadeo Aguilar PT

## 2018-09-04 ENCOUNTER — HOSPITAL ENCOUNTER (OUTPATIENT)
Dept: PHYSICAL THERAPY | Age: 43
Setting detail: THERAPIES SERIES
Discharge: HOME | End: 2018-09-04
Payer: COMMERCIAL

## 2018-09-04 PROCEDURE — G8985 CARRY GOAL STATUS: HCPCS | Performed by: PHYSICAL THERAPIST

## 2018-09-04 PROCEDURE — G0283 ELEC STIM OTHER THAN WOUND: HCPCS | Performed by: PHYSICAL THERAPIST

## 2018-09-04 PROCEDURE — 97140 MANUAL THERAPY 1/> REGIONS: CPT | Performed by: PHYSICAL THERAPIST

## 2018-09-04 PROCEDURE — G8984 CARRY CURRENT STATUS: HCPCS | Performed by: PHYSICAL THERAPIST

## 2018-09-04 PROCEDURE — 97110 THERAPEUTIC EXERCISES: CPT | Performed by: PHYSICAL THERAPIST

## 2018-09-04 NOTE — PLAN OF CARE
well as HEP. Soreness feels like it's \"right on the collarbone. \"     Current Pain Scale: 4/10     Type: [x]Constant           []Intermitment    []Radiating         []Localized                     []other:                Functional Limitations: [x]Lifting/reaching          []Grooming                    [x]Carrying          []ADL's   []Driving []Sports/Recreations     [x]Other: work        OBJECTIVE: AROM flex 145, ER T1, IR T12;  MMT flex 4, abd 4, IR 4, ER 4     Joint mobility:               []Normal               []Hypo              []Hyper     Palpation:      Orthopedic Tests:      OTHER:        ASSESSMENT: Fatigued with body blade and TB fwd lifts to simulate work tasks. See goal status above.                     Response to Treatment:              [x]Patient is responding well to treatment and improvement is noted with regards      to goals              []Patient should continue to improve in reasonable time if they continue HEP              []Patient has plateaued and is no longer responding to skilled PT intervention                        []Patient is getting worse and would benefit from return to referring MD              []Patient unable to adhere to initial POC        Functional deficiencies which affect ADL's and Reduce overall functional level:                [x]decreased RC/scapular strength and neuromuscular control - Reduced overall      functional level with carrying /lifting              [x]decreased UE ROM/joint mobility- Reduced overall functional level with     carrying /lifting               []pain/difficulty with driving and/or computer work- Reduced overall functional          level                   [x]pain at end of day with ADL tasks- Reduced overall functional level              [x]pain/difficulty with lifting/reaching/carrying     - Reduced overall functional level      with carrying and lifting              []unable to perform sport/recreational activity due to pain and dysfunction [x]other: not released to full work                 Prognosis/Rehab Potential:               []Excellent              [x]Good                 []Fair              []Poor:     Toleration of evaluation or treatment:               []Excellent              [x]Good                 []Fair              []Poor      New or Updated Goals (if applicable):  [x] No change to goals established upon initial eval/last progress note:  New Goals:     Long Term Goals: To be achieved by 8/31/18:-all goals in progress 9/4  1. Disability index score of 30 % or less for the [facility] to assist with reaching prior level of function. 2. Patient will demonstrate increased AROM to [facility] [place] R shoulder to allow for proper joint functioning as indicated by patients Functional Deficits. 3. Patient will demonstrate an increase in Strength to [facility] 4/5 R shoulder to allow for proper functional mobility as indicated by patients Functional Deficits. 4. Patient will return to overhead reaching functional activities without increased symptoms or restriction. 5. Pt will be able to resume full duty at work.      Rehab Potential:         []Excellent         [] Good              [] Fair     [] Poor     Plan of Care:  [] Continue Current Therapy Intervention     Frequency/Duration:  2 days per week for 4 Weeks:  HEP instruction: yes  1. Therapeutic exercise including: strength training, ROM, NMR and proprioception for the scapula, core and Upper extremity  2. Manual therapy as indicated including Dry Needling/IASTM, STM, PROM, Gr I-IV mobilizations, spinal mobilization/manipulation. 3. Modalities as needed including: thermal agents, E-stim, US, iontophoresis as indicated. 4. Patient education on joint protection, activity modification, progression of HEP.        Electronically signed by:  Kelly Chambers, PT

## 2018-09-04 NOTE — FLOWSHEET NOTE
Ex Sets/rep comments   Shoulder shrugs, scap sets  HEP   Seated cane ER S  Supine cane ER S  HEP   Table slides flexion  Table ER S HEP   pendulums HEP   AROM biceps, wrist, hand HEP   UT S HEP   Semi prone standing end of table row, ext  Standing bent over row to triceps ext     scap cues   SL ER    Supine flexion     Sleeper S     Hands behind head IR/ER S          SB roll flex, scap circles on table     Pulley's flexion / scaption     Cage flexion squat  Rotation S  IR S 10\"x10  x10  x10    TB alternating row  Scap/UT cues R   Finisher W  Circles cw/ccw     Doorway stretch R UE 4 x 20\" For HEP   Modified IR strap, gentle  TB IR S    HEP   Wall slide w/ triceps S     Belt IR S , up  Added to HEP   TB IR pull behind back  TB IR, ER  TB punch fwd, scap  TB upward punch  Wall  punch  TB shoulder ext  TB low trap pull  Single arm row fwd, diagonal  Assisted flexion  PNF D1/D2 flexion  HAB     YTT 3x10      YTT 2x10 ea  YTT 2x10 New TB HEP H.O. And bands provided 8/1/18   1/2 wall no money (6\" box)    1/2 wall lateral slides (6\" box)    Bicep curls  Weight fwd/upward press B hand hold YTT 3x10       Wall slide with ecc lower     Wall push ups  HEP   Body blade IR/ER, fwd punch, horizontal 3x15\" ea  10x5\"    Ball on wall 4 ways flex, scap x15 ea    Standing shoulder flexion YTT 3x6    Standing I, Y, T          ATC      Pt/daughter ed:  HEP, ,  x3'    Manual Intervention     PROM R shoulder all planes, oscillations, gr 3-4 post/inf GH mobs, SL scap mobs, STM , , post shoulder, subscap x15' decreased Tenderness post shoulder                               NMR re-education                                                 Therapeutic Exercise and NMR EXR  [x] (68325) Provided verbal/tactile cueing for activities related to strengthening, flexibility, endurance, ROM  for improvements in scapular, scapulothoracic and UE control with self care, reaching, carrying, lifting, house/yardwork, driving/computer work.     [x]

## 2018-09-06 ENCOUNTER — HOSPITAL ENCOUNTER (OUTPATIENT)
Dept: PHYSICAL THERAPY | Age: 43
Setting detail: THERAPIES SERIES
End: 2018-09-06
Payer: COMMERCIAL

## 2018-09-06 ENCOUNTER — APPOINTMENT (OUTPATIENT)
Dept: PHYSICAL THERAPY | Age: 43
End: 2018-09-06
Payer: COMMERCIAL

## 2018-09-09 ENCOUNTER — TELEPHONE (OUTPATIENT)
Dept: ORTHOPEDIC SURGERY | Age: 43
End: 2018-09-09

## 2018-09-09 NOTE — TELEPHONE ENCOUNTER
Received request from the [facility] asking Dr. 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 It appears Dr. Jody Lipscomb has not asked for this to be added to the claim, therefore reaching out to clinic to see if this is something he would want to proceed in requesting. Reached out to [facility] via [employer].   Pending completion until clinic responds

## 2018-09-11 ENCOUNTER — HOSPITAL ENCOUNTER (OUTPATIENT)
Dept: PHYSICAL THERAPY | Age: 43
Setting detail: THERAPIES SERIES
Discharge: HOME | End: 2018-09-11
Payer: COMMERCIAL

## 2018-09-11 PROCEDURE — G0283 ELEC STIM OTHER THAN WOUND: HCPCS | Performed by: PHYSICAL THERAPIST

## 2018-09-11 PROCEDURE — 97140 MANUAL THERAPY 1/> REGIONS: CPT | Performed by: PHYSICAL THERAPIST

## 2018-09-11 PROCEDURE — 97110 THERAPEUTIC EXERCISES: CPT | Performed by: PHYSICAL THERAPIST

## 2018-09-11 NOTE — FLOWSHEET NOTE
4  RESTRICTIONS/PRECAUTIONS: Hx DVT    Exercises/Interventions:   Therapeutic Ex Sets/rep comments   Shoulder shrugs, scap sets  HEP   Seated cane ER S  Supine cane ER S  HEP   Table slides flexion  Table ER S HEP   pendulums HEP   AROM biceps, wrist, hand HEP   UT S HEP   Semi prone standing end of table row, ext  Standing bent over row to triceps ext     2# [id number] cues   SL ER    Supine flexion     Sleeper S     Hands behind head IR/ER S          SB roll flex, scap circles on table     Pulley's flexion / scaption     Cage flexion squat  Rotation S  IR S 10\"x10  x10  x10    TB alternating row  Scap/UT cues R   Finisher W  Circles cw/ccw     Doorway stretch R UE 4 x 20\" For HEP   Modified IR strap, gentle  TB IR S    HEP   Wall slide w/ triceps S     Belt IR S , up  Added to HEP   TB IR pull behind back  TB IR, ER  TB punch fwd, scap  TB upward punch  Wall  punch  TB shoulder ext  TB low trap pull  Single arm row fwd, diagonal  Assisted flexion  PNF D1/D2 flexion  HAB             YTT 2x10 New TB HEP H.O.  And bands provided 8/1/18   1/2 wall no money (6\" box)    1/2 wall lateral slides (6\" box)    Bicep curls  Weight fwd/upward press B hand hold   3# x20 ea     Wall slide with ecc lower     Wall push ups  HEP   Body blade IR/ER, fwd punch, horizontal 3x15\" ea  10x5\"    Ball on wall 4 ways flex, scap x15 ea    Standing shoulder flexion     Standing I, Y, T 2# x10 ea R/L    Wall pull backs  Wall lat reaches OVL 3x10 R/L  OVL x10 R/L              ATC      Pt/daughter ed:  HEP, ,  x3'    Manual Intervention     PROM R shoulder all planes, oscillations, gr 3-4 post/inf GH mobs, SL scap mobs, STM , , post shoulder, subscap x15' decreased Tenderness post shoulder                               NMR re-education                                                 Therapeutic Exercise and NMR EXR  [x] (23886) Provided verbal/tactile cueing for activities related to strengthening, flexibility, endurance, ROM  for improvements in scapular, scapulothoracic and UE control with self care, reaching, carrying, lifting, house/yardwork, driving/computer work. [x] (47359) Provided verbal/tactile cueing for activities related to improving balance, coordination, kinesthetic sense, posture, motor skill, proprioception  to assist with  scapular, scapulothoracic and UE control with self care, reaching, carrying, lifting, house/yardwork, driving/computer work. Therapeutic Activities:    [] (83234 or 11214) Provided verbal/tactile cueing for activities related to improving balance, coordination, kinesthetic sense, posture, motor skill, proprioception and motor activation to allow for proper function of scapular, scapulothoracic and UE control with self care, carrying, lifting, driving/computer work.      Home Exercise Program:    [x] (34045) Reviewed/Progressed HEP activities related to strengthening, flexibility, endurance, ROM of scapular, scapulothoracic and UE control with self care, reaching, carrying, lifting, house/yardwork, driving/computer work  [] (63607) Reviewed/Progressed HEP activities related to improving balance, coordination, kinesthetic sense, posture, motor skill, proprioception of scapular, scapulothoracic and UE control with self care, reaching, carrying, lifting, house/yardwork, driving/computer work      Manual Treatments:  PROM / STM / Oscillations-Mobs:  G-I, II, III, IV (PA's, Inf., Post.)  [x] (64562) Provided manual therapy to mobilize soft tissue/joints of cervical/CT, scapular GHJ and UE for the purpose of modulating pain, promoting relaxation,  increasing ROM, reducing/eliminating soft tissue swelling/inflammation/restriction, improving soft tissue extensibility and allowing for proper ROM for normal function with self care, reaching, carrying, lifting, house/yardwork, driving/computer work    Modalities:  PM/CP x15' R shoulder 1:30-1:45  Charges:  Timed Code Treatment Minutes: 60   Total Treatment extension.     Treatment/Activity Tolerance:  [x] Patient tolerated treatment well [] Patient limited by fatique  [] Patient limited by pain  [] Patient limited by other medical complications  [] Other:     Prognosis: [x] Good [] Fair  [] Poor    Patient Requires Follow-up: [x] Yes  [] No    PLAN: Progress work simulated tasks, cont 2x/week  [x] Continue per plan of care [] Alter current plan (see comments)  [] Plan of care initiated [] Hold pending MD visit [] Discharge    Electronically signed by: Doretta Schwab, PT

## 2018-09-13 ENCOUNTER — HOSPITAL ENCOUNTER (OUTPATIENT)
Dept: PHYSICAL THERAPY | Age: 43
Setting detail: THERAPIES SERIES
Discharge: HOME | End: 2018-09-13
Payer: COMMERCIAL

## 2018-09-13 ENCOUNTER — APPOINTMENT (OUTPATIENT)
Dept: PHYSICAL THERAPY | Age: 43
End: 2018-09-13
Payer: COMMERCIAL

## 2018-09-13 PROCEDURE — 97140 MANUAL THERAPY 1/> REGIONS: CPT | Performed by: PHYSICAL THERAPIST

## 2018-09-13 PROCEDURE — G0283 ELEC STIM OTHER THAN WOUND: HCPCS | Performed by: PHYSICAL THERAPIST

## 2018-09-13 PROCEDURE — 97110 THERAPEUTIC EXERCISES: CPT | Performed by: PHYSICAL THERAPIST

## 2018-09-13 NOTE — FLOWSHEET NOTE
[facility], [address] [place]  Phone: [phone]  Fax [phone]      Physical Therapy Daily Treatment Note  Date:  2018    Patient Name:  Kyler Diaz    :  1975  MRN: [id number]  Restrictions/Precautions:    Medical/Treatment Diagnosis Information:  · Diagnosis: M75.01 (ICD-10-CM) - Adhesive bursitis of right shoulder s/p ascope 18 debridement, NATALIYA, [place]  · Treatment Diagnosis: M75.01 (ICD-10-CM) - Adhesive bursitis of right shoulder  Insurance/Certification information:  PT Insurance Information: [facility] 12 visits through 18  Physician Information:  Referring Practitioner: Bernie Ruelas of care signed (Y/N):      Date of Patient follow up with Physician: 18    G-Code (if applicable):  93%   34% 18 23% 18 Date G-Code Applied:  18  PT G-Codes  Functional Assessment Tool Used: Yudith Diver  Score: 93%  Functional Limitation: Carrying, moving and handling objects  Carrying, Moving and Handling Objects Current Status (): At least 80 percent but less than 100 percent impaired, limited or restricted  Carrying, Moving and Handling Objects Goal Status (): At least 20 percent but less than 40 percent impaired, limited or restricted    Progress Note: []  Yes  []  No  Next due by: Visit #20  Or 18    Latex Allergy:  [x]NO      []YES  Preferred Language for Healthcare:   [x]English       []other:    Visit # 600 [address]    12 expires 18  12 expires 18    []no        [x]yes:     Pain level: 4/10     SUBJECTIVE: Pt reports she was fatigued following last session, without increased pain. She is anxious to see MD tomorrow regarding return to work plan.     OBJECTIVE:  Op note   Observation:   Test measurements:    AROM flex 145, abd 130, ER T1, IR T11;  MMT flex 4, abd 4, IR 4+, ER 4    RESTRICTIONS/PRECAUTIONS: Hx DVT    Exercises/Interventions:   Therapeutic Ex Sets/rep comments   Shoulder shrugs, scap sets  HEP   Seated cane ER S  Supine cane ER S  HEP   Table slides flexion  Table ER S HEP   pendulums HEP   AROM biceps, wrist, hand HEP   UT S HEP   Semi prone standing end of table row, ext  Standing bent over row to triceps ext     2# [id number] cues   SL ER    Supine flexion     Sleeper S     Hands behind head IR/ER S          SB roll flex, scap circles on table     Pulley's flexion / scaption     Cage flexion squat  Rotation S  IR S 10\"x10  x10  x10    TB alternating row  Scap/UT cues R   Finisher W  Circles cw/ccw     Doorway stretch R UE 4 x 20\" For HEP   Modified IR strap, gentle  TB IR S    HEP   Wall slide w/ triceps S     Belt IR S , up  Added to HEP   TB IR pull behind back  TB IR, ER  TB punch fwd, scap  TB upward punch  Wall  punch  TB shoulder ext  TB low trap pull  Single arm row fwd, diagonal  Assisted flexion  PNF D1/D2 flexion  HAB             YTT 2x10 New TB HEP H.O.  And bands provided 8/1/18   1/2 wall no money (6\" box)    1/2 wall lateral slides (6\" box)    Bicep curls  Weight fwd/upward press B hand hold        Wall slide with ecc lower     Wall push ups  HEP   Body blade IR/ER, fwd punch, horizontal 3x15\" ea  10x5\"    Ball on wall 4 ways flex, scap x15 ea    Standing shoulder flexion YTT 2x10    Standing I, Y, T 2# 2x10 ea R/L    Wall pull backs  Wall lat reaches OVL 3x10 R/L  OVL x10 R/L              ATC      Pt/daughter ed:  HEP, ,  x3'    Manual Intervention     PROM R shoulder all planes, oscillations, gr 3-4 post/inf GH mobs, SL scap mobs, STM , , post shoulder, subscap x15' decreased Tenderness post shoulder                               NMR re-education                                                 Therapeutic Exercise and NMR EXR  [x] (13840) Provided verbal/tactile cueing for activities related to strengthening, flexibility, endurance, ROM  for improvements in scapular, scapulothoracic 20 minutes face-to-face)  [] EVAL (MOD) 71079 (typically 30 minutes face-to-face)  [] EVAL (HIGH) 74272 (typically 45 minutes face-to-face)  [] RE-EVAL     [x] TR(98961) x  3 9:45-10:30 [] IONTO  [] NMR (97602) x      [] VASO  [x] Manual (69693) x  1   9:30-9:45 [] Other:  [] TA x       [] Mech Traction (97646)  [] ES(attended) (26405)      [x] ES (un) (48504):     Fariha Gambino stated goal: able to resume full duty work    Therapist goals for Patient:   Short Term Goals: To be achieved in: 2 weeks  1. Independent in HEP and progression per patient tolerance, in order to prevent re-injury. MET  2. Patient will have a decrease in pain to facilitate improvement in movement, function, and ADLs as indicated by Functional Deficits. PROGRESSING    Long Term Goals: To be achieved by 8/31/18:  1. Disability index score of 30 % or less for the [facility] to assist with reaching prior level of function. --met  2. Patient will demonstrate increased AROM to [facility] [place] R shoulder to allow for proper joint functioning as indicated by patients Functional Deficits. --progressing  3. Patient will demonstrate an increase in Strength to [facility] 4/5 R shoulder to allow for proper functional mobility as indicated by patients Functional Deficits. --progressing  4. Patient will return to overhead reaching functional activities without increased symptoms or restriction. --progressing  5. Pt will be able to resume full duty at work. --pending MD appt    Progression Towards Functional goals:  [x] Patient is progressing as expected towards functional goals listed. [x] Progression is slowed due to complexities listed. ---NATALIYA  [] Progression has been slowed due to co-morbidities. [] Plan just implemented, too soon to assess goals progression  [] Other:     ASSESSMENT:  Continued progress made towards goals as stated. Shoulder fatigue with progressions with less cuing and compensations noted.   Pt would benefit from continued PT next week to assist with transition back to full work.     Treatment/Activity Tolerance:  [x] Patient tolerated treatment well [] Patient limited by fatique  [] Patient limited by pain  [] Patient limited by other medical complications  [] Other:     Prognosis: [x] Good [] Fair  [] Poor    Patient Requires Follow-up: [x] Yes  [] No    PLAN: Progress work simulated tasks, cont 2x/week  [x] Continue per plan of care [] Alter current plan (see comments)  [] Plan of care initiated [] Hold pending MD visit [] Discharge    Electronically signed by: Mitra Figueroa PT

## 2018-09-13 NOTE — OP NOTE
[facility], [address] [place]  Phone: [phone]  Fax [phone]    Date: 9/13/2018  Physician: Tyler Roper  Patient: Roma Mccarthy Diagnosis: M75.01 (ICD-10-CM) - Adhesive bursitis of right shoulder s/p ascope 6/26/18 debridement, Sher AN    Patient has received 20 sessions of Physical Therapy over a 11 week period. Functional Questionnaire Score: Initial 93%, Current 23%  Pain reported has decreased from 9/10 to 4/10    ROM Initial (R) Initial (L) Current (R) Current (L)   · AROM flex 145, abd 130, ER T1, IR T11                                   Strength       · MMT flex 4, abd 4, IR 4+, ER 4                                     Functional Activity Checklist: The patient continues to have moderate difficulty with the following:   [] Personal care  [] Reaching / overhead  [] Standing    [] Housework chores  [] Climbing  [] Driving / riding in a vehicle    [] Work  [] Squatting  [] Bed / vehicle mobility    [] Lifting  [] Walking  [] Sleeping    [] Pushing / pulling  [] Sitting  [] Concentrating / reading     Specific Functional Improvement and Impression:  Continued progress made towards goals. Shoulder fatigue with progressions with less cuing and compensations noted. Pt would benefit from continued PT next week to assist with transition back to full work. Summary:   [x] Patient is progressing as expected for this condition   [] Patient is progressing, but slower than expected for this condition   [] Patient is not progressing  Clinician Recommendations:   [x] Continue rehabilitation due to objective improvement and continued functional deficits. [] Follow up periodically to advance home exercise program to match level of function. [] Continue rehabitation due to objective improvement and continued functional deficits with progression to work conditioning.    [] Discharge to post-rehab program secondary to maximizing \"medical necessity\" of physical / occupational therapy.    [] Discharge independent in a home program as:  [] All goals achieved  [] Maximized \"medical necessity\" of physical / occupational therapy  [] No subjective or objective improvements    Plan: cont 2x/week through next week to assist with transition back to work pending MD release  Electronically signed by: Sahra Bassett PT   License #: [id number]    Physician Recommendations:  [] Follow treatment plan as above [] Discontinue physical therapy  [] Change plan to: _______________________________________________________________

## 2018-09-14 ENCOUNTER — OFFICE VISIT (OUTPATIENT)
Dept: ORTHOPEDIC SURGERY | Age: 43
End: 2018-09-14

## 2018-09-14 VITALS — HEIGHT: 64 IN | BODY MASS INDEX: 30.56 KG/M2 | WEIGHT: 179 LBS

## 2018-09-14 DIAGNOSIS — Z98.890 S/P ARTHROSCOPY OF SHOULDER: Primary | ICD-10-CM

## 2018-09-14 DIAGNOSIS — I82.621 ACUTE EMBOLISM AND THROMBOSIS OF DEEP VEIN OF RIGHT UPPER EXTREMITY (HCC): ICD-10-CM

## 2018-09-14 PROCEDURE — 99024 POSTOP FOLLOW-UP VISIT: CPT | Performed by: ORTHOPAEDIC SURGERY

## 2018-09-14 NOTE — PROGRESS NOTES
The patient presents today after her 6/26/18 right shoulder surgery. She was a capsulorrhaphy with 2 anchors as well as a near and a Smiley procedure. Overall, she reports she is doing fairly well. She is working in physical therapy has been limiting her mimicking her job as a . She is about 85-90% normal range of motion. Good strength. No signs of infection or DVT. No crepitus. She is going to be released to return to work effective immediately. She struggles she can let us know. Otherwise she'll be seen back in a month for what should hopefully be her final check. We are going to refill her Xarelto. She is going to discuss with her hematologist how long she is going to need to be on Xarelto for her DVT. She understands that from an orthopedic perspective she will no longer need Xarelto after this dose. I have personally performed and/or participated in the history, exam and medical decision making and agree with all pertinent clinical information. I have also reviewed and agree with the past medical, family and social history unless otherwise noted. This dictation was performed with a verbal recognition program (DRAGON) and it was checked for errors. It is possible that there are still dictated errors within this office note. If so, please bring any errors to my attention for an addendum. All efforts were made to ensure that this office note is accurate.           Felicia Vela MD

## 2018-09-17 NOTE — TELEPHONE ENCOUNTER
Per Dr. Nathanael Hernadez reply:     Message   Received: 5 days ago   Message Contents   Link Cazares, ZAYRA Aguilera,   Yes Dr. Henok Parker said we can try and add this condition to the claim. Thanks,   Donnie Morrissey    Previous Messages           [id number] faxed to [employer] today.

## 2018-10-12 ENCOUNTER — OFFICE VISIT (OUTPATIENT)
Dept: ORTHOPEDIC SURGERY | Age: 43
End: 2018-10-12
Payer: COMMERCIAL

## 2018-10-12 VITALS — WEIGHT: 179.01 LBS | BODY MASS INDEX: 30.56 KG/M2 | HEIGHT: 64 IN

## 2018-10-12 DIAGNOSIS — Z98.890 S/P ARTHROSCOPY OF SHOULDER: Primary | ICD-10-CM

## 2018-10-12 PROCEDURE — 99213 OFFICE O/P EST LOW 20 MIN: CPT | Performed by: ORTHOPAEDIC SURGERY

## 2018-10-12 RX ORDER — CEFDINIR 300 MG/1
300 CAPSULE ORAL
COMMUNITY
Start: 2018-09-30 | End: 2019-01-10 | Stop reason: ALTCHOICE

## 2018-10-12 RX ORDER — SUMATRIPTAN 5 MG/1
6 SPRAY NASAL
COMMUNITY

## 2018-10-12 NOTE — PROGRESS NOTES
affect are appropriate       Right shoulder PHYSICAL EXAMINATION:  · Well-healed surgical incisions. · Mild tenderness to palpation along the anterior aspect of shoulder. · Range of motion is limited to approximately 120° of abduction by pain. · Negative Thomas's testing. Neurovascular exam is intact distally. Gait & station: Normal gait. Additional Examinations:        Left Upper Extremity: Examination of the left upper extremity does not show any tenderness, deformity or injury. Range of motion is unremarkable. There is no gross instability. There are no rashes, ulcerations or lesions. Strength and tone are normal.      Orders     Orders Placed This Encounter   Procedures    Ambulatory referral to Physical Therapy     Referral Priority:   Routine     Referral Type:   Eval and Treat     Referral Reason:   Specialty Services Required     Requested Specialty:   Physical Therapy     Number of Visits Requested:   1         Assessment / Treatment Plan:     1. Status post right shoulder capsulorrhaphy, struggling    Overall, she seems to be struggling with range of motion and pain of the shoulder. I would like to attempt another round of physical therapy with a cortisone injection to see if we can improve her function with the shoulder. She was given an order for therapy today and we will submit for approval of her cortisone injection. She'll return for this injection once approved. I spent 15 minutes face-to-face with the patient and greater than 50% of that time was spent counseling/coordinating care for the above-stated diagnosis     I have personally performed and/or participated in the history, exam and medical decision making and agree with all pertinent clinical information. I have also reviewed and agree with the past medical, family and social history unless otherwise noted. This dictation was performed with a verbal recognition program (DRAGON) and it was checked for errors.

## 2018-10-23 ENCOUNTER — HOSPITAL ENCOUNTER (OUTPATIENT)
Dept: PHYSICAL THERAPY | Age: 43
Setting detail: THERAPIES SERIES
Discharge: HOME | End: 2018-10-23
Payer: COMMERCIAL

## 2018-10-23 PROCEDURE — G8985 CARRY GOAL STATUS: HCPCS | Performed by: PHYSICAL THERAPIST

## 2018-10-23 PROCEDURE — G8984 CARRY CURRENT STATUS: HCPCS | Performed by: PHYSICAL THERAPIST

## 2018-10-23 PROCEDURE — 97164 PT RE-EVAL EST PLAN CARE: CPT | Performed by: PHYSICAL THERAPIST

## 2018-10-23 PROCEDURE — 97110 THERAPEUTIC EXERCISES: CPT | Performed by: PHYSICAL THERAPIST

## 2018-10-23 PROCEDURE — 97140 MANUAL THERAPY 1/> REGIONS: CPT | Performed by: PHYSICAL THERAPIST

## 2018-10-23 NOTE — PLAN OF CARE
[facility], [address], [address] [place] [address]  Phone: [phone]  Fax [phone]      Physical Therapy Re-Certification Plan of Care     Dear  Dr Chnio Navarrete,     We had the pleasure of treating the following patient for physical therapy services at [address] [place] [address]. A summary of our findings can be found in the updated assessment below. This includes our plan of care. If you have any questions or concerns regarding these findings, please do not hesitate to contact me at the office phone number checked above. Thank you for the referral.      Physician Signature:________________________________Date:__________________  By signing above, therapists plan is approved by physician        Patient: Nancy Estrella                           : 1975                                    MRN: [id number]  Referring Physician:                                                   Evaluation Date: 2018                                           Medical/Treatment Diagnosis Information:  · Diagnosis: M75.01 (ICD-10-CM) - Adhesive bursitis of right shoulder s/p ascope 18 debridement, Sher AN  · Treatment Diagnosis: M75.01 (ICD-10-CM) - Adhesive bursitis of right shoulder  Insurance/Certification information:  PT Insurance Information: [facility]  Date Range:18-10/23/18  Total visits:19       G-Codes: (if applicable) PT G-Codes  Functional Assessment Tool Used: Qdash  Score: 55%  Functional Limitation: Carrying, moving and handling objects  Carrying, Moving and Handling Objects Current Status (): At least 40 percent but less than 60 percent impaired, limited or restricted  Carrying, Moving and Handling Objects Goal Status ():  At least 20 percent but less than 40 percent impaired, limited or restricted   Functional Index used:     SUBJECTIVE:Pt reporting she went back to work and has maintained her HEP (more

## 2018-10-23 NOTE — FLOWSHEET NOTE
[facility], [address]  Phone: [phone]  Fax [phone]      Physical Therapy Daily Treatment Note  Date:  10/23/2018    Patient Name:  Sherman Galeazzi    :  1975  MRN: [id number]  Restrictions/Precautions:    Medical/Treatment Diagnosis Information:  · Diagnosis: M75.01 (ICD-10-CM) - Adhesive bursitis of right shoulder s/p ascope 18 debridement, NATALIYA, Neer  · Treatment Diagnosis: M75.01 (ICD-10-CM) - Adhesive bursitis of right shoulder  Insurance/Certification information:  PT Insurance Information: [facility] 12 visits through 18  Physician Information:  Referring Practitioner: Gwen Nice of care signed (Y/N):      Date of Patient follow up with Physician: 18    G-Code (if applicable):  21%   34% 18 23% 18 55% 10/23/18 Date G-Code Applied:  18  PT G-Codes  Functional Assessment Tool Used: Nolene Reny  Score: 93%  Functional Limitation: Carrying, moving and handling objects  Carrying, Moving and Handling Objects Current Status (): At least 80 percent but less than 100 percent impaired, limited or restricted  Carrying, Moving and Handling Objects Goal Status (): At least 20 percent but less than 40 percent impaired, limited or restricted    Progress Note: [x]  Yes  []  No  Next due by: Visit #20  Or 18    Latex Allergy:  [x]NO      []YES  Preferred Language for Healthcare:   [x]English       []other:    Visit # 600 [address]    12 expires 18  12 expires 18  8 expires 18    []no        [x]yes:     Pain level: 6-7/10     SUBJECTIVE: Pt reporting she went back to work and has maintained her HEP (more stretching than strengthening) and has had a gradual increased in her over all shoulder pain. Pain is described as sharp and deep in her shoulder and limits her sleeping, reaching, lifting and self care activities.   She tissue extensibility and allowing for proper ROM for normal function with self care, reaching, carrying, lifting, house/yardwork, driving/computer work    Modalities:    Charges:  Timed Code Treatment Minutes: 25   Total Treatment Minutes: 40     [] EVAL (LOW) 49587 (typically 20 minutes face-to-face)  [] EVAL (MOD) 18778 (typically 30 minutes face-to-face)  [] EVAL (HIGH) 62046 (typically 45 minutes face-to-face)  [x] RE-EVAL  10:45-11:00    [x] MIREILLE(78051) x  111:20-11:30 [] IONTO  [] NMR (88959) x      [] VASO  [x] Manual (12260) x  1   11:00-11:20 [] Other:  [] TA x       [] Mech Traction (79192)  [] ES(attended) (24475)      [] ES (un) (26049):     [place] stated goal: able to resume full duty work    Therapist goals for Patient:   Short Term Goals: To be achieved in: 2 weeks  1. Independent in HEP and progression per patient tolerance, in order to prevent re-injury. MET  2. Patient will have a decrease in pain to facilitate improvement in movement, function, and ADLs as indicated by Functional Deficits. PROGRESSING    Long Term Goals: To be achieved by11/19/18:-all goals in progress 9/4  1. Disability index score of 30 % or less for the [facility] assist with reaching prior level of function. 2. Patient will demonstrate increased AROM to [facility] [place] R shoulder to allow for proper joint functioning as indicated by patients Functional Deficits. 3. Patient will demonstrate an increase in Strength to grossly 4/5 R shoulder to allow for proper functional mobility as indicated by patients Functional Deficits. 4. Patient will return to overhead reaching functional activities without increased symptoms or restriction. 5. Pt will be able to resume full duty at work. --met, but with increased pain    Progression Towards Functional goals:  [x] Patient is progressing as expected towards functional goals listed. [x] Progression is slowed due to complexities listed. ---NATALIYA  [] Progression has been slowed due to

## 2018-10-29 ENCOUNTER — HOSPITAL ENCOUNTER (OUTPATIENT)
Dept: PHYSICAL THERAPY | Age: 43
Setting detail: THERAPIES SERIES
Discharge: HOME | End: 2018-10-29
Payer: COMMERCIAL

## 2018-10-29 PROCEDURE — 97140 MANUAL THERAPY 1/> REGIONS: CPT | Performed by: PHYSICAL THERAPIST

## 2018-10-29 PROCEDURE — 97110 THERAPEUTIC EXERCISES: CPT | Performed by: PHYSICAL THERAPIST

## 2018-11-01 ENCOUNTER — HOSPITAL ENCOUNTER (OUTPATIENT)
Dept: PHYSICAL THERAPY | Age: 43
Setting detail: THERAPIES SERIES
Discharge: HOME | End: 2018-11-01
Payer: COMMERCIAL

## 2018-11-06 ENCOUNTER — HOSPITAL ENCOUNTER (OUTPATIENT)
Dept: PHYSICAL THERAPY | Age: 43
Setting detail: THERAPIES SERIES
Discharge: HOME | End: 2018-11-06
Payer: COMMERCIAL

## 2018-11-06 PROCEDURE — 97110 THERAPEUTIC EXERCISES: CPT | Performed by: PHYSICAL THERAPIST

## 2018-11-06 PROCEDURE — 97140 MANUAL THERAPY 1/> REGIONS: CPT | Performed by: PHYSICAL THERAPIST

## 2018-11-06 NOTE — FLOWSHEET NOTE
minutes face-to-face)  [] EVAL (HIGH) 56345 (typically 45 minutes face-to-face)  [] RE-EVAL      [x] RA(70691) x  210:25-10:55 [] IONTO  [] NMR (94492) x      [] VASO  [x] Manual (44324) x  2   10:00-10:25 [] Other:  [] TA x       [] Mech Traction (13565)  [] ES(attended) (79966)      [] ES (un) (03982):     Lauren Duran stated goal: able to resume full duty work    Therapist goals for Patient:   Short Term Goals: To be achieved in: 2 weeks  1. Independent in HEP and progression per patient tolerance, in order to prevent re-injury. MET  2. Patient will have a decrease in pain to facilitate improvement in movement, function, and ADLs as indicated by Functional Deficits. PROGRESSING    Long Term Goals: To be achieved by11/19/18:-all goals in progress 9/4  1. Disability index score of 30 % or less for the [facility] assist with reaching prior level of function. 2. Patient will demonstrate increased AROM to [facility] [place] R shoulder to allow for proper joint functioning as indicated by patients Functional Deficits. 3. Patient will demonstrate an increase in Strength to grossly 4/5 R shoulder to allow for proper functional mobility as indicated by patients Functional Deficits. 4. Patient will return to overhead reaching functional activities without increased symptoms or restriction. 5. Pt will be able to resume full duty at work. --met, but with increased pain    Progression Towards Functional goals:  [x] Patient is progressing as expected towards functional goals listed. [x] Progression is slowed due to complexities listed. ---NATALIYA  [] Progression has been slowed due to co-morbidities. [] Plan just implemented, too soon to assess goals progression  [] Other:     ASSESSMENT:  Shoulder fatigued with progressed TE this date. Continued mild cues for scap stability with SL ER and wall TB exercises. Increased tenderness/TP post shoulder which improved following manuals.     Treatment/Activity Tolerance:  [x] Patient tolerated treatment well [] Patient limited by fatique  [] Patient limited by pain  [] Patient limited by other medical complications  [] Other:     Prognosis: [x] Good [] Fair  [] Poor    Patient Requires Follow-up: [x] Yes  [] No    PLAN: Progress strength, posture, work simulated tasks, cont 2x/week for 4 weeks  [] Continue per plan of care [x] Alter current plan (see comments)  [] Plan of care initiated [] Hold pending MD visit [] Discharge    Electronically signed by: Dejon Lucas PT

## 2018-11-08 ENCOUNTER — HOSPITAL ENCOUNTER (OUTPATIENT)
Dept: PHYSICAL THERAPY | Age: 43
Setting detail: THERAPIES SERIES
Discharge: HOME | End: 2018-11-08
Payer: COMMERCIAL

## 2018-11-08 PROCEDURE — 97140 MANUAL THERAPY 1/> REGIONS: CPT | Performed by: PHYSICAL THERAPIST

## 2018-11-08 PROCEDURE — G0283 ELEC STIM OTHER THAN WOUND: HCPCS | Performed by: PHYSICAL THERAPIST

## 2018-11-08 PROCEDURE — 97110 THERAPEUTIC EXERCISES: CPT | Performed by: PHYSICAL THERAPIST

## 2018-11-08 NOTE — FLOWSHEET NOTE
[facility], [address]  Phone: [phone]  Fax [phone]      Physical Therapy Daily Treatment Note  Date:  2018    Patient Name:  Ligia Benz    :  1975  MRN: [id number]  Restrictions/Precautions:    Medical/Treatment Diagnosis Information:  · Diagnosis: M75.01 (ICD-10-CM) - Adhesive bursitis of right shoulder s/p ascope 18 debridement, NATALIYA, Neer  · Treatment Diagnosis: M75.01 (ICD-10-CM) - Adhesive bursitis of right shoulder  Insurance/Certification information:  PT Insurance Information: [facility] 12 visits through 18  Physician Information:  Referring Practitioner: Renee Goddard of care signed (Y/N):      Date of Patient follow up with Physician: 18    G-Code (if applicable):  89% 3/3/82  34% 18 23% 18 55% 10/23/18 Date G-Code Applied:  18  PT G-Codes  Functional Assessment Tool Used: Shashi Christensen  Score: 93%  Functional Limitation: Carrying, moving and handling objects  Carrying, Moving and Handling Objects Current Status (): At least 80 percent but less than 100 percent impaired, limited or restricted  Carrying, Moving and Handling Objects Goal Status (): At least 20 percent but less than 40 percent impaired, limited or restricted    Progress Note: []  Yes  []  No  Next due by: Visit #20  Or 18    Latex Allergy:  [x]NO      []YES  Preferred Language for Healthcare:   [x]English       []other:    Visit # Insurance Allowable Requires auth    12 expires 18  12 expires 18  8 expires 18    []no        [x]yes:     Pain level: 7/10     SUBJECTIVE: Pt reports she had to turn the steering wheel quickly on the bus and her shoulder really hurts now.   Getting cortisone injection tomorrow.       OBJECTIVE:    Observation:   Test measurements:      RESTRICTIONS/PRECAUTIONS: Hx DVT    Exercises/Interventions:   Therapeutic Ex Sets/rep comments Shoulder shrugs, scap sets  HEP   Seated cane ER S  Supine cane ER S  HEP   Table slides flexion  Table ER S HEP   pendulums HEP   AROM biceps, wrist, hand HEP   UT S HEP   Semi prone standing end of table row, ext  Standing bent over row to triceps ext HEP   SL ER 1# 2x8, [id number]   Supine SA punches 1# 2x10   Supine flexion    Sleeper S    Hands behind head IR/ER S        SB roll flex, scap circles on table    Pulley's flexion / scaption    Cage flexion squat  Rotation S  IR S    TB alternating row  Scap/UT cues R   Finisher W  Circles cw/ccw     Doorway stretch R UE For HEP   Modified IR strap, gentle  TB IR S HEP   Wall slide w/ triceps S    Belt IR S , up Added to HEP   TB IR pull behind back  TB IR, ER  TB punch fwd, scap  TB upward punch  Wall  punch  TB PNF D1 ext  TB shoulder ext  TB low trap pull  Single arm row fwd, diagonal  Assisted flexion  PNF D1/D2 flexion  HAB   Green TT 3x10 BNew TB HEP H.O.  And bands provided 8/1/18   1/2 wall no money (6\" box)    1/2 wall lateral slides (6\" box)    Bicep curls  Weight fwd/upward press B hand hold        Wall slide with ecc lower     Wall push ups  HEP   Body blade IR/ER, fwd punch, horizontal    Ball on wall 4 ways flex, scap    Standing shoulder flexion    Standing I, Y, T    Wall pull backs  Wall diagonal reaches  Wall lat reaches YTB 2x10 R/L               ATC      Pt/daughter ed:   HEP strength focus 10/23/18--no $, prone row/ext, TB row/ext, TB IR/ER   Manual Intervention x20'    PROM R shoulder all planes, oscillations, gr 3-4 post/inf GH mobs, , STM , , post shoulder, subscap, biceps, UT        Pt did not feel significant benefit                       NMR re-education                                                 Therapeutic Exercise and NMR EXR  [x] (97375) Provided verbal/tactile cueing for activities related to strengthening, flexibility, endurance, ROM  for improvements in scapular, scapulothoracic and UE control with self care, reaching, treatment well [] Patient limited by fatique  [] Patient limited by pain  [] Patient limited by other medical complications  [] Other:     Prognosis: [x] Good [] Fair  [] Poor    Patient Requires Follow-up: [x] Yes  [] No    PLAN: Progress strength, posture, work simulated tasks, cont 2x/week for 4 weeks  [] Continue per plan of care [x] Alter current plan (see comments)  [] Plan of care initiated [] Hold pending MD visit [] Discharge    Electronically signed by: Dena Green PT

## 2018-11-09 ENCOUNTER — OFFICE VISIT (OUTPATIENT)
Dept: ORTHOPEDIC SURGERY | Age: 43
End: 2018-11-09
Payer: COMMERCIAL

## 2018-11-09 VITALS — BODY MASS INDEX: 30.56 KG/M2 | WEIGHT: 179.01 LBS | HEIGHT: 64 IN

## 2018-11-09 DIAGNOSIS — M75.01 ADHESIVE CAPSULITIS OF RIGHT SHOULDER: Primary | ICD-10-CM

## 2018-11-09 PROCEDURE — 20611 DRAIN/INJ JOINT/BURSA W/US: CPT | Performed by: PHYSICIAN ASSISTANT

## 2018-11-12 ENCOUNTER — HOSPITAL ENCOUNTER (OUTPATIENT)
Dept: PHYSICAL THERAPY | Age: 43
Setting detail: THERAPIES SERIES
Discharge: HOME | End: 2018-11-12
Payer: COMMERCIAL

## 2018-11-12 PROCEDURE — 97140 MANUAL THERAPY 1/> REGIONS: CPT | Performed by: PHYSICAL THERAPIST

## 2018-11-12 PROCEDURE — G0283 ELEC STIM OTHER THAN WOUND: HCPCS | Performed by: PHYSICAL THERAPIST

## 2018-11-12 PROCEDURE — 97110 THERAPEUTIC EXERCISES: CPT | Performed by: PHYSICAL THERAPIST

## 2018-11-12 NOTE — FLOWSHEET NOTE
[facility], [address] [place]  Phone: [phone]  Fax [phone]      Physical Therapy Daily Treatment Note  Date:  2018    Patient Name:  Cheyanne Guadarrama    :  1975  MRN: [id number]  Restrictions/Precautions:    Medical/Treatment Diagnosis Information:  · Diagnosis: M75.01 (ICD-10-CM) - Adhesive bursitis of right shoulder s/p ascope 18 debridement, NATALIYA, Neer  · Treatment Diagnosis: M75.01 (ICD-10-CM) - Adhesive bursitis of right shoulder  Insurance/Certification information:  PT Insurance Information: [facility] 12 visits through 18  Physician Information:  Referring Practitioner: Deanna Hebert of care signed (Y/N):      Date of Patient follow up with Physician: 18    G-Code (if applicable):  50% 36  34% 18 23% 18 55% 10/23/18 Date G-Code Applied:  18  PT G-Codes  Functional Assessment Tool Used: Manuel Teran  Score: 93%  Functional Limitation: Carrying, moving and handling objects  Carrying, Moving and Handling Objects Current Status (): At least 80 percent but less than 100 percent impaired, limited or restricted  Carrying, Moving and Handling Objects Goal Status (): At least 20 percent but less than 40 percent impaired, limited or restricted    Progress Note: []  Yes  []  No  Next due by: Visit #20  Or 18    Latex Allergy:  [x]NO      []YES  Preferred Language for Healthcare:   [x]English       []other:    Visit # Insurance Allowable Requires auth    12 expires 18  12 expires 18  8 expires 18    []no        [x]yes:     Pain level: 7/10     SUBJECTIVE: Pt reports she has been really sore since getting cortisone injection on 18.   She iced her shoulder a lot over the weekend.     OBJECTIVE:    Observation:   Test measurements:      RESTRICTIONS/PRECAUTIONS: Hx DVT    Exercises/Interventions:   Therapeutic Ex Sets/rep comments EVAL (LOW) 32539 (typically 20 minutes face-to-face)  [] EVAL (MOD) 17718 (typically 30 minutes face-to-face)  [] EVAL (HIGH) 72050 (typically 45 minutes face-to-face)  [] RE-EVAL      [x] MZ(23665) x  210:20-10:50 [] IONTO  [] NMR (10057) x      [] VASO  [x] Manual (19150) x  1   10:00-10:20 [] Other:  [] TA x       [] Mech Traction (33562)  [] ES(attended) (47120)      [] ES (un) (91049):     Ajit Fear stated goal: able to resume full duty work    Therapist goals for Patient:   Short Term Goals: To be achieved in: 2 weeks  1. Independent in HEP and progression per patient tolerance, in order to prevent re-injury. MET  2. Patient will have a decrease in pain to facilitate improvement in movement, function, and ADLs as indicated by Functional Deficits. PROGRESSING    Long Term Goals: To be achieved by11/19/18:-all goals in progress 9/4  1. Disability index score of 30 % or less for the [facility] assist with reaching prior level of function. 2. Patient will demonstrate increased AROM to [facility] [place] R shoulder to allow for proper joint functioning as indicated by patients Functional Deficits. 3. Patient will demonstrate an increase in Strength to grossly 4/5 R shoulder to allow for proper functional mobility as indicated by patients Functional Deficits. 4. Patient will return to overhead reaching functional activities without increased symptoms or restriction. 5. Pt will be able to resume full duty at work. --met, but with increased pain    Progression Towards Functional goals:  [x] Patient is progressing as expected towards functional goals listed. [x] Progression is slowed due to complexities listed. ---NATALIYA  [] Progression has been slowed due to co-morbidities. [] Plan just implemented, too soon to assess goals progression  [] Other:     ASSESSMENT:  Still able to progress strengthening TE despite increased reported soreness. Continued tenderness lat insertion and SAB.     Treatment/Activity

## 2018-11-14 ENCOUNTER — APPOINTMENT (OUTPATIENT)
Dept: PHYSICAL THERAPY | Age: 43
End: 2018-11-14
Payer: COMMERCIAL

## 2018-11-15 ENCOUNTER — APPOINTMENT (OUTPATIENT)
Dept: PHYSICAL THERAPY | Age: 43
End: 2018-11-15
Payer: COMMERCIAL

## 2018-11-19 ENCOUNTER — HOSPITAL ENCOUNTER (OUTPATIENT)
Dept: PHYSICAL THERAPY | Age: 43
Setting detail: THERAPIES SERIES
Discharge: HOME | End: 2018-11-19
Payer: COMMERCIAL

## 2018-11-20 ENCOUNTER — APPOINTMENT (OUTPATIENT)
Dept: PHYSICAL THERAPY | Age: 43
End: 2018-11-20
Payer: COMMERCIAL

## 2018-11-27 ENCOUNTER — APPOINTMENT (OUTPATIENT)
Dept: PHYSICAL THERAPY | Age: 43
End: 2018-11-27
Payer: COMMERCIAL

## 2018-11-29 ENCOUNTER — APPOINTMENT (OUTPATIENT)
Dept: PHYSICAL THERAPY | Age: 43
End: 2018-11-29
Payer: COMMERCIAL

## 2018-12-07 ENCOUNTER — OFFICE VISIT (OUTPATIENT)
Dept: ORTHOPEDIC SURGERY | Age: 43
End: 2018-12-07
Payer: COMMERCIAL

## 2018-12-07 VITALS — WEIGHT: 179.01 LBS | HEIGHT: 64 IN | BODY MASS INDEX: 30.56 KG/M2

## 2018-12-07 DIAGNOSIS — M75.01 ADHESIVE CAPSULITIS OF RIGHT SHOULDER: Primary | ICD-10-CM

## 2018-12-07 DIAGNOSIS — S42.254A NONDISPLACED FRACTURE OF GREATER TUBEROSITY OF RIGHT HUMERUS, INITIAL ENCOUNTER FOR CLOSED FRACTURE: ICD-10-CM

## 2018-12-07 DIAGNOSIS — Z98.890 S/P ARTHROSCOPY OF SHOULDER: ICD-10-CM

## 2018-12-07 PROCEDURE — 99213 OFFICE O/P EST LOW 20 MIN: CPT | Performed by: ORTHOPAEDIC SURGERY

## 2019-01-10 ENCOUNTER — APPOINTMENT (OUTPATIENT)
Dept: GENERAL RADIOLOGY | Age: 44
End: 2019-01-10
Payer: COMMERCIAL

## 2019-01-10 ENCOUNTER — HOSPITAL ENCOUNTER (OUTPATIENT)
Age: 44
Setting detail: OBSERVATION
Discharge: HOME | End: 2019-01-11
Attending: EMERGENCY MEDICINE | Admitting: INTERNAL MEDICINE
Payer: COMMERCIAL

## 2019-01-10 ENCOUNTER — APPOINTMENT (OUTPATIENT)
Dept: CT IMAGING | Age: 44
End: 2019-01-10
Payer: COMMERCIAL

## 2019-01-10 DIAGNOSIS — R00.0 TACHYCARDIA: ICD-10-CM

## 2019-01-10 DIAGNOSIS — Z79.01 CHRONIC ANTICOAGULATION: ICD-10-CM

## 2019-01-10 DIAGNOSIS — R07.9 CHEST PAIN WITH LOW RISK OF ACUTE CORONARY SYNDROME: Primary | ICD-10-CM

## 2019-01-10 LAB
A/G RATIO: 1.3 (ref 1.1–2.2)
ALBUMIN SERPL-MCNC: 5.1 G/DL (ref 3.4–5)
ALP BLD-CCNC: 114 U/L (ref 40–129)
ALT SERPL-CCNC: 14 U/L (ref 10–40)
ANION GAP SERPL CALCULATED.3IONS-SCNC: 13 MMOL/L (ref 3–16)
AST SERPL-CCNC: 18 U/L (ref 15–37)
BASOPHILS ABSOLUTE: 0.1 K/UL (ref 0–0.2)
BASOPHILS RELATIVE PERCENT: 0.6 %
BILIRUB SERPL-MCNC: <0.2 MG/DL (ref 0–1)
BUN BLDV-MCNC: 19 MG/DL (ref 7–20)
CALCIUM SERPL-MCNC: 10.5 MG/DL (ref 8.3–10.6)
CHLORIDE BLD-SCNC: 99 MMOL/L (ref 99–110)
CO2: 23 MMOL/L (ref 21–32)
CREAT SERPL-MCNC: 0.9 MG/DL (ref 0.6–1.1)
D DIMER: <200 NG/ML DDU (ref 0–229)
EKG ATRIAL RATE: 116 BPM
EKG DIAGNOSIS: NORMAL
EKG P AXIS: 67 DEGREES
EKG P-R INTERVAL: 142 MS
EKG Q-T INTERVAL: 328 MS
EKG QRS DURATION: 70 MS
EKG QTC CALCULATION (BAZETT): 455 MS
EKG R AXIS: 16 DEGREES
EKG T AXIS: 92 DEGREES
EKG VENTRICULAR RATE: 116 BPM
EOSINOPHILS ABSOLUTE: 0 K/UL (ref 0–0.6)
EOSINOPHILS RELATIVE PERCENT: 0 %
GFR AFRICAN AMERICAN: >60
GFR NON-AFRICAN AMERICAN: >60
GLOBULIN: 3.9 G/DL
GLUCOSE BLD-MCNC: 113 MG/DL (ref 70–99)
HCT VFR BLD CALC: 42.5 % (ref 36–48)
HEMOGLOBIN: 14.1 G/DL (ref 12–16)
INR BLD: 1.04 (ref 0.86–1.14)
LACTIC ACID: 1.9 MMOL/L (ref 0.4–2)
LACTIC ACID: 2.3 MMOL/L (ref 0.4–2)
LYMPHOCYTES ABSOLUTE: 1.2 K/UL (ref 1–5.1)
LYMPHOCYTES RELATIVE PERCENT: 10.7 %
MCH RBC QN AUTO: 31.8 PG (ref 26–34)
MCHC RBC AUTO-ENTMCNC: 33.1 G/DL (ref 31–36)
MCV RBC AUTO: 96.1 FL (ref 80–100)
MONOCYTES ABSOLUTE: 0.2 K/UL (ref 0–1.3)
MONOCYTES RELATIVE PERCENT: 1.5 %
NEUTROPHILS ABSOLUTE: 10.1 K/UL (ref 1.7–7.7)
NEUTROPHILS RELATIVE PERCENT: 87.2 %
PDW BLD-RTO: 13.8 % (ref 12.4–15.4)
PLATELET # BLD: 371 K/UL (ref 135–450)
PMV BLD AUTO: 8.9 FL (ref 5–10.5)
POTASSIUM SERPL-SCNC: 3.4 MMOL/L (ref 3.5–5.1)
PROTHROMBIN TIME: 11.9 SEC (ref 9.8–13)
RBC # BLD: 4.42 M/UL (ref 4–5.2)
SODIUM BLD-SCNC: 135 MMOL/L (ref 136–145)
TOTAL PROTEIN: 9 G/DL (ref 6.4–8.2)
TROPONIN: <0.01 NG/ML
WBC # BLD: 11.6 K/UL (ref 4–11)

## 2019-01-10 PROCEDURE — 2580000003 HC RX 258: Performed by: PHYSICIAN ASSISTANT

## 2019-01-10 PROCEDURE — G0378 HOSPITAL OBSERVATION PER HR: HCPCS

## 2019-01-10 PROCEDURE — 6360000002 HC RX W HCPCS: Performed by: EMERGENCY MEDICINE

## 2019-01-10 PROCEDURE — 6360000002 HC RX W HCPCS: Performed by: INTERNAL MEDICINE

## 2019-01-10 PROCEDURE — 85025 COMPLETE CBC W/AUTO DIFF WBC: CPT

## 2019-01-10 PROCEDURE — 6360000004 HC RX CONTRAST MEDICATION: Performed by: EMERGENCY MEDICINE

## 2019-01-10 PROCEDURE — 94640 AIRWAY INHALATION TREATMENT: CPT

## 2019-01-10 PROCEDURE — 99222 1ST HOSP IP/OBS MODERATE 55: CPT | Performed by: PHYSICIAN ASSISTANT

## 2019-01-10 PROCEDURE — 84484 ASSAY OF TROPONIN QUANT: CPT

## 2019-01-10 PROCEDURE — 83605 ASSAY OF LACTIC ACID: CPT

## 2019-01-10 PROCEDURE — 6370000000 HC RX 637 (ALT 250 FOR IP): Performed by: PHYSICIAN ASSISTANT

## 2019-01-10 PROCEDURE — 85379 FIBRIN DEGRADATION QUANT: CPT

## 2019-01-10 PROCEDURE — 36415 COLL VENOUS BLD VENIPUNCTURE: CPT

## 2019-01-10 PROCEDURE — 71260 CT THORAX DX C+: CPT

## 2019-01-10 PROCEDURE — 94761 N-INVAS EAR/PLS OXIMETRY MLT: CPT

## 2019-01-10 PROCEDURE — 85610 PROTHROMBIN TIME: CPT

## 2019-01-10 PROCEDURE — 93005 ELECTROCARDIOGRAM TRACING: CPT | Performed by: EMERGENCY MEDICINE

## 2019-01-10 PROCEDURE — 71046 X-RAY EXAM CHEST 2 VIEWS: CPT

## 2019-01-10 PROCEDURE — 80053 COMPREHEN METABOLIC PANEL: CPT

## 2019-01-10 PROCEDURE — 96376 TX/PRO/DX INJ SAME DRUG ADON: CPT

## 2019-01-10 PROCEDURE — 96374 THER/PROPH/DIAG INJ IV PUSH: CPT

## 2019-01-10 PROCEDURE — 94150 VITAL CAPACITY TEST: CPT

## 2019-01-10 PROCEDURE — 99291 CRITICAL CARE FIRST HOUR: CPT

## 2019-01-10 PROCEDURE — 93010 ELECTROCARDIOGRAM REPORT: CPT | Performed by: INTERNAL MEDICINE

## 2019-01-10 PROCEDURE — 96361 HYDRATE IV INFUSION ADD-ON: CPT

## 2019-01-10 PROCEDURE — 6370000000 HC RX 637 (ALT 250 FOR IP): Performed by: EMERGENCY MEDICINE

## 2019-01-10 PROCEDURE — 2580000003 HC RX 258: Performed by: EMERGENCY MEDICINE

## 2019-01-10 RX ORDER — IPRATROPIUM BROMIDE AND ALBUTEROL SULFATE 2.5; .5 MG/3ML; MG/3ML
1 SOLUTION RESPIRATORY (INHALATION) ONCE
Status: COMPLETED | OUTPATIENT
Start: 2019-01-10 | End: 2019-01-10

## 2019-01-10 RX ORDER — ALBUTEROL SULFATE 2.5 MG/3ML
2.5 SOLUTION RESPIRATORY (INHALATION) EVERY 4 HOURS PRN
Status: DISCONTINUED | OUTPATIENT
Start: 2019-01-10 | End: 2019-01-11 | Stop reason: HOSPADM

## 2019-01-10 RX ORDER — ACETAMINOPHEN 325 MG/1
650 TABLET ORAL EVERY 4 HOURS PRN
Status: DISCONTINUED | OUTPATIENT
Start: 2019-01-10 | End: 2019-01-11 | Stop reason: HOSPADM

## 2019-01-10 RX ORDER — PROTRIPTYLINE HYDROCHLORIDE 5 MG/1
10 TABLET, FILM COATED ORAL NIGHTLY
Status: DISCONTINUED | OUTPATIENT
Start: 2019-01-10 | End: 2019-01-11 | Stop reason: HOSPADM

## 2019-01-10 RX ORDER — TOPIRAMATE 100 MG/1
100 TABLET, FILM COATED ORAL 2 TIMES DAILY
Status: DISCONTINUED | OUTPATIENT
Start: 2019-01-10 | End: 2019-01-11 | Stop reason: HOSPADM

## 2019-01-10 RX ORDER — AZITHROMYCIN 250 MG/1
250 TABLET, FILM COATED ORAL DAILY
Status: DISCONTINUED | OUTPATIENT
Start: 2019-01-11 | End: 2019-01-11 | Stop reason: HOSPADM

## 2019-01-10 RX ORDER — ASPIRIN 81 MG/1
324 TABLET, CHEWABLE ORAL ONCE
Status: COMPLETED | OUTPATIENT
Start: 2019-01-10 | End: 2019-01-10

## 2019-01-10 RX ORDER — HYDROCODONE BITARTRATE AND ACETAMINOPHEN 5; 325 MG/1; MG/1
1 TABLET ORAL EVERY 6 HOURS PRN
Status: DISCONTINUED | OUTPATIENT
Start: 2019-01-10 | End: 2019-01-11 | Stop reason: HOSPADM

## 2019-01-10 RX ORDER — MAGNESIUM SULFATE 1 G/100ML
1 INJECTION INTRAVENOUS PRN
Status: DISCONTINUED | OUTPATIENT
Start: 2019-01-10 | End: 2019-01-11 | Stop reason: HOSPADM

## 2019-01-10 RX ORDER — DICYCLOMINE HYDROCHLORIDE 10 MG/1
10 CAPSULE ORAL EVERY 6 HOURS PRN
Status: DISCONTINUED | OUTPATIENT
Start: 2019-01-10 | End: 2019-01-11 | Stop reason: HOSPADM

## 2019-01-10 RX ORDER — LEVOTHYROXINE SODIUM 112 UG/1
112 TABLET ORAL DAILY
Status: DISCONTINUED | OUTPATIENT
Start: 2019-01-11 | End: 2019-01-11 | Stop reason: HOSPADM

## 2019-01-10 RX ORDER — 0.9 % SODIUM CHLORIDE 0.9 %
1000 INTRAVENOUS SOLUTION INTRAVENOUS ONCE
Status: COMPLETED | OUTPATIENT
Start: 2019-01-10 | End: 2019-01-10

## 2019-01-10 RX ORDER — POTASSIUM CHLORIDE 7.45 MG/ML
10 INJECTION INTRAVENOUS PRN
Status: DISCONTINUED | OUTPATIENT
Start: 2019-01-10 | End: 2019-01-11 | Stop reason: HOSPADM

## 2019-01-10 RX ORDER — BENZONATATE 100 MG/1
100 CAPSULE ORAL 3 TIMES DAILY PRN
Status: DISCONTINUED | OUTPATIENT
Start: 2019-01-10 | End: 2019-01-11 | Stop reason: HOSPADM

## 2019-01-10 RX ORDER — PREDNISONE 20 MG/1
40 TABLET ORAL DAILY
Status: DISCONTINUED | OUTPATIENT
Start: 2019-01-10 | End: 2019-01-11 | Stop reason: HOSPADM

## 2019-01-10 RX ORDER — AZITHROMYCIN 250 MG/1
500 TABLET, FILM COATED ORAL ONCE
Status: COMPLETED | OUTPATIENT
Start: 2019-01-10 | End: 2019-01-10

## 2019-01-10 RX ORDER — SODIUM CHLORIDE 0.9 % (FLUSH) 0.9 %
10 SYRINGE (ML) INJECTION EVERY 12 HOURS SCHEDULED
Status: DISCONTINUED | OUTPATIENT
Start: 2019-01-10 | End: 2019-01-11 | Stop reason: HOSPADM

## 2019-01-10 RX ORDER — SODIUM CHLORIDE 9 MG/ML
INJECTION, SOLUTION INTRAVENOUS CONTINUOUS
Status: DISCONTINUED | OUTPATIENT
Start: 2019-01-10 | End: 2019-01-11 | Stop reason: HOSPADM

## 2019-01-10 RX ORDER — MORPHINE SULFATE 4 MG/ML
4 INJECTION, SOLUTION INTRAMUSCULAR; INTRAVENOUS ONCE
Status: COMPLETED | OUTPATIENT
Start: 2019-01-10 | End: 2019-01-10

## 2019-01-10 RX ORDER — POTASSIUM CHLORIDE 20 MEQ/1
40 TABLET, EXTENDED RELEASE ORAL PRN
Status: DISCONTINUED | OUTPATIENT
Start: 2019-01-10 | End: 2019-01-11 | Stop reason: HOSPADM

## 2019-01-10 RX ORDER — ONDANSETRON 2 MG/ML
4 INJECTION INTRAMUSCULAR; INTRAVENOUS EVERY 6 HOURS PRN
Status: DISCONTINUED | OUTPATIENT
Start: 2019-01-10 | End: 2019-01-11 | Stop reason: HOSPADM

## 2019-01-10 RX ORDER — SODIUM CHLORIDE 0.9 % (FLUSH) 0.9 %
10 SYRINGE (ML) INJECTION PRN
Status: DISCONTINUED | OUTPATIENT
Start: 2019-01-10 | End: 2019-01-11 | Stop reason: HOSPADM

## 2019-01-10 RX ORDER — BACLOFEN 10 MG/1
10 TABLET ORAL 2 TIMES DAILY
Status: DISCONTINUED | OUTPATIENT
Start: 2019-01-10 | End: 2019-01-11 | Stop reason: HOSPADM

## 2019-01-10 RX ORDER — MORPHINE SULFATE 4 MG/ML
2 INJECTION, SOLUTION INTRAMUSCULAR; INTRAVENOUS ONCE
Status: COMPLETED | OUTPATIENT
Start: 2019-01-10 | End: 2019-01-10

## 2019-01-10 RX ORDER — INDOMETHACIN 25 MG/1
25 CAPSULE ORAL DAILY PRN
Status: DISCONTINUED | OUTPATIENT
Start: 2019-01-10 | End: 2019-01-11 | Stop reason: HOSPADM

## 2019-01-10 RX ORDER — PANTOPRAZOLE SODIUM 40 MG/1
40 TABLET, DELAYED RELEASE ORAL
Status: DISCONTINUED | OUTPATIENT
Start: 2019-01-11 | End: 2019-01-11 | Stop reason: HOSPADM

## 2019-01-10 RX ORDER — ASPIRIN 81 MG/1
81 TABLET, CHEWABLE ORAL DAILY
Status: DISCONTINUED | OUTPATIENT
Start: 2019-01-11 | End: 2019-01-11 | Stop reason: HOSPADM

## 2019-01-10 RX ADMIN — MORPHINE SULFATE 2 MG: 4 INJECTION INTRAVENOUS at 10:36

## 2019-01-10 RX ADMIN — ALBUTEROL SULFATE 2.5 MG: 2.5 SOLUTION RESPIRATORY (INHALATION) at 21:09

## 2019-01-10 RX ADMIN — RIVAROXABAN 20 MG: 20 TABLET, FILM COATED ORAL at 20:52

## 2019-01-10 RX ADMIN — BACLOFEN 10 MG: 10 TABLET ORAL at 20:52

## 2019-01-10 RX ADMIN — IPRATROPIUM BROMIDE AND ALBUTEROL SULFATE 1 AMPULE: .5; 3 SOLUTION RESPIRATORY (INHALATION) at 10:34

## 2019-01-10 RX ADMIN — AZITHROMYCIN 500 MG: 250 TABLET, FILM COATED ORAL at 15:10

## 2019-01-10 RX ADMIN — PREDNISONE 40 MG: 20 TABLET ORAL at 15:10

## 2019-01-10 RX ADMIN — BENZONATATE 100 MG: 100 CAPSULE ORAL at 17:29

## 2019-01-10 RX ADMIN — SODIUM CHLORIDE 1000 ML: 9 INJECTION, SOLUTION INTRAVENOUS at 10:34

## 2019-01-10 RX ADMIN — TOPIRAMATE 100 MG: 100 TABLET, FILM COATED ORAL at 20:51

## 2019-01-10 RX ADMIN — SODIUM CHLORIDE: 9 INJECTION, SOLUTION INTRAVENOUS at 15:09

## 2019-01-10 RX ADMIN — HYDROCODONE BITARTRATE AND ACETAMINOPHEN 1 TABLET: 5; 325 TABLET ORAL at 20:51

## 2019-01-10 RX ADMIN — MORPHINE SULFATE 4 MG: 4 INJECTION INTRAVENOUS at 12:42

## 2019-01-10 RX ADMIN — ASPIRIN 81 MG 324 MG: 81 TABLET ORAL at 10:34

## 2019-01-10 RX ADMIN — IOPAMIDOL 85 ML: 755 INJECTION, SOLUTION INTRAVENOUS at 11:35

## 2019-01-10 RX ADMIN — SODIUM CHLORIDE 1000 ML: 9 INJECTION, SOLUTION INTRAVENOUS at 15:11

## 2019-01-10 RX ADMIN — ACETAMINOPHEN 650 MG: 325 TABLET ORAL at 18:58

## 2019-01-10 ASSESSMENT — PAIN DESCRIPTION - LOCATION
LOCATION: CHEST
LOCATION: CHEST

## 2019-01-10 ASSESSMENT — PAIN SCALES - GENERAL
PAINLEVEL_OUTOF10: 6
PAINLEVEL_OUTOF10: 7
PAINLEVEL_OUTOF10: 8
PAINLEVEL_OUTOF10: 7
PAINLEVEL_OUTOF10: 3
PAINLEVEL_OUTOF10: 3
PAINLEVEL_OUTOF10: 8
PAINLEVEL_OUTOF10: 7

## 2019-01-10 ASSESSMENT — PAIN DESCRIPTION - FREQUENCY: FREQUENCY: INTERMITTENT

## 2019-01-10 ASSESSMENT — HEART SCORE: ECG: 1

## 2019-01-10 ASSESSMENT — PAIN DESCRIPTION - PAIN TYPE
TYPE: ACUTE PAIN
TYPE: ACUTE PAIN

## 2019-01-10 ASSESSMENT — PAIN DESCRIPTION - DESCRIPTORS
DESCRIPTORS: DISCOMFORT;SORE
DESCRIPTORS: HEAVINESS

## 2019-01-10 ASSESSMENT — PAIN - FUNCTIONAL ASSESSMENT: PAIN_FUNCTIONAL_ASSESSMENT: ACTIVITIES ARE NOT PREVENTED

## 2019-01-10 ASSESSMENT — PAIN DESCRIPTION - PROGRESSION: CLINICAL_PROGRESSION: NOT CHANGED

## 2019-01-10 ASSESSMENT — PAIN DESCRIPTION - ORIENTATION: ORIENTATION: MID

## 2019-01-11 VITALS
WEIGHT: 170.8 LBS | RESPIRATION RATE: 16 BRPM | HEIGHT: 64 IN | TEMPERATURE: 97.7 F | SYSTOLIC BLOOD PRESSURE: 125 MMHG | OXYGEN SATURATION: 99 % | DIASTOLIC BLOOD PRESSURE: 72 MMHG | HEART RATE: 75 BPM | BODY MASS INDEX: 29.16 KG/M2

## 2019-01-11 LAB
CHOLESTEROL, TOTAL: 153 MG/DL (ref 0–199)
EKG ATRIAL RATE: 92 BPM
EKG DIAGNOSIS: NORMAL
EKG P AXIS: 70 DEGREES
EKG P-R INTERVAL: 150 MS
EKG Q-T INTERVAL: 386 MS
EKG QRS DURATION: 74 MS
EKG QTC CALCULATION (BAZETT): 477 MS
EKG R AXIS: 24 DEGREES
EKG T AXIS: 45 DEGREES
EKG VENTRICULAR RATE: 92 BPM
HCT VFR BLD CALC: 35.2 % (ref 36–48)
HDLC SERPL-MCNC: 50 MG/DL (ref 40–60)
HEMOGLOBIN: 11.3 G/DL (ref 12–16)
LDL CHOLESTEROL CALCULATED: 85 MG/DL
MCH RBC QN AUTO: 31.8 PG (ref 26–34)
MCHC RBC AUTO-ENTMCNC: 32.2 G/DL (ref 31–36)
MCV RBC AUTO: 98.8 FL (ref 80–100)
PDW BLD-RTO: 14 % (ref 12.4–15.4)
PLATELET # BLD: 237 K/UL (ref 135–450)
PMV BLD AUTO: 8.8 FL (ref 5–10.5)
RBC # BLD: 3.57 M/UL (ref 4–5.2)
TRIGL SERPL-MCNC: 91 MG/DL (ref 0–150)
VLDLC SERPL CALC-MCNC: 18 MG/DL
WBC # BLD: 10 K/UL (ref 4–11)

## 2019-01-11 PROCEDURE — 93005 ELECTROCARDIOGRAM TRACING: CPT | Performed by: PHYSICIAN ASSISTANT

## 2019-01-11 PROCEDURE — 36415 COLL VENOUS BLD VENIPUNCTURE: CPT

## 2019-01-11 PROCEDURE — 93010 ELECTROCARDIOGRAM REPORT: CPT | Performed by: INTERNAL MEDICINE

## 2019-01-11 PROCEDURE — 99217 PR OBSERVATION CARE DISCHARGE MANAGEMENT: CPT | Performed by: INTERNAL MEDICINE

## 2019-01-11 PROCEDURE — 6370000000 HC RX 637 (ALT 250 FOR IP): Performed by: PHYSICIAN ASSISTANT

## 2019-01-11 PROCEDURE — G0378 HOSPITAL OBSERVATION PER HR: HCPCS

## 2019-01-11 PROCEDURE — 80061 LIPID PANEL: CPT

## 2019-01-11 PROCEDURE — 2580000003 HC RX 258: Performed by: PHYSICIAN ASSISTANT

## 2019-01-11 PROCEDURE — 85027 COMPLETE CBC AUTOMATED: CPT

## 2019-01-11 RX ORDER — BENZONATATE 100 MG/1
100 CAPSULE ORAL 3 TIMES DAILY PRN
Qty: 20 CAPSULE | Refills: 0 | Status: SHIPPED | OUTPATIENT
Start: 2019-01-11

## 2019-01-11 RX ORDER — AZITHROMYCIN 250 MG/1
250 TABLET, FILM COATED ORAL DAILY
Qty: 4 TABLET | Refills: 0 | Status: SHIPPED | OUTPATIENT
Start: 2019-01-11 | End: 2019-01-15

## 2019-01-11 RX ORDER — PREDNISONE 20 MG/1
TABLET ORAL
Qty: 9 TABLET | Refills: 0 | Status: SHIPPED | OUTPATIENT
Start: 2019-01-11

## 2019-01-11 RX ADMIN — SODIUM CHLORIDE: 9 INJECTION, SOLUTION INTRAVENOUS at 01:34

## 2019-01-11 RX ADMIN — SERTRALINE HYDROCHLORIDE 200 MG: 50 TABLET ORAL at 09:08

## 2019-01-11 RX ADMIN — TOPIRAMATE 100 MG: 100 TABLET, FILM COATED ORAL at 09:08

## 2019-01-11 RX ADMIN — PREDNISONE 40 MG: 20 TABLET ORAL at 09:09

## 2019-01-11 RX ADMIN — BENZONATATE 100 MG: 100 CAPSULE ORAL at 01:38

## 2019-01-11 RX ADMIN — HYDROCODONE BITARTRATE AND ACETAMINOPHEN 1 TABLET: 5; 325 TABLET ORAL at 03:18

## 2019-01-11 RX ADMIN — AZITHROMYCIN 250 MG: 250 TABLET, FILM COATED ORAL at 09:08

## 2019-01-11 RX ADMIN — ASPIRIN 81 MG 81 MG: 81 TABLET ORAL at 09:09

## 2019-01-11 RX ADMIN — BACLOFEN 10 MG: 10 TABLET ORAL at 09:08

## 2019-01-11 ASSESSMENT — PAIN DESCRIPTION - PROGRESSION: CLINICAL_PROGRESSION: NOT CHANGED

## 2019-01-11 ASSESSMENT — PAIN DESCRIPTION - DESCRIPTORS: DESCRIPTORS: SORE

## 2019-01-11 ASSESSMENT — PAIN DESCRIPTION - PAIN TYPE: TYPE: ACUTE PAIN

## 2019-01-11 ASSESSMENT — PAIN DESCRIPTION - ORIENTATION: ORIENTATION: OTHER (COMMENT)

## 2019-01-11 ASSESSMENT — PAIN DESCRIPTION - ONSET: ONSET: ON-GOING

## 2019-01-11 ASSESSMENT — PAIN DESCRIPTION - LOCATION: LOCATION: CHEST

## 2019-01-11 ASSESSMENT — PAIN DESCRIPTION - FREQUENCY: FREQUENCY: CONTINUOUS

## 2019-01-11 ASSESSMENT — PAIN SCALES - GENERAL
PAINLEVEL_OUTOF10: 7
PAINLEVEL_OUTOF10: 0

## 2019-03-06 ENCOUNTER — OFFICE VISIT (OUTPATIENT)
Dept: ORTHOPEDIC SURGERY | Age: 44
End: 2019-03-06
Payer: COMMERCIAL

## 2019-03-06 VITALS — HEIGHT: 64 IN | BODY MASS INDEX: 29.17 KG/M2 | WEIGHT: 170.86 LBS

## 2019-03-06 DIAGNOSIS — M75.01 ADHESIVE CAPSULITIS OF RIGHT SHOULDER: ICD-10-CM

## 2019-03-06 DIAGNOSIS — M79.601 RIGHT ARM PAIN: ICD-10-CM

## 2019-03-06 DIAGNOSIS — S42.254A NONDISPLACED FRACTURE OF GREATER TUBEROSITY OF RIGHT HUMERUS, INITIAL ENCOUNTER FOR CLOSED FRACTURE: ICD-10-CM

## 2019-03-06 DIAGNOSIS — M25.511 RIGHT SHOULDER PAIN, UNSPECIFIED CHRONICITY: Primary | ICD-10-CM

## 2019-03-06 DIAGNOSIS — Z98.890 S/P ARTHROSCOPY OF SHOULDER: ICD-10-CM

## 2019-03-06 PROCEDURE — 99214 OFFICE O/P EST MOD 30 MIN: CPT | Performed by: ORTHOPAEDIC SURGERY

## 2019-03-08 ENCOUNTER — TELEPHONE (OUTPATIENT)
Dept: ORTHOPEDIC SURGERY | Age: 44
End: 2019-03-08

## 2019-04-02 ENCOUNTER — OFFICE VISIT (OUTPATIENT)
Dept: ORTHOPEDIC SURGERY | Age: 44
End: 2019-04-02
Payer: COMMERCIAL

## 2019-04-02 VITALS — HEIGHT: 64 IN | WEIGHT: 170.86 LBS | BODY MASS INDEX: 29.17 KG/M2

## 2019-04-02 DIAGNOSIS — S42.254A NONDISPLACED FRACTURE OF GREATER TUBEROSITY OF RIGHT HUMERUS, INITIAL ENCOUNTER FOR CLOSED FRACTURE: Primary | ICD-10-CM

## 2019-04-02 DIAGNOSIS — M75.01 ADHESIVE CAPSULITIS OF RIGHT SHOULDER: ICD-10-CM

## 2019-04-02 DIAGNOSIS — M25.511 RIGHT SHOULDER PAIN, UNSPECIFIED CHRONICITY: ICD-10-CM

## 2019-04-02 PROCEDURE — 99242 OFF/OP CONSLTJ NEW/EST SF 20: CPT | Performed by: ORTHOPAEDIC SURGERY

## 2019-04-05 ENCOUNTER — TELEPHONE (OUTPATIENT)
Dept: ORTHOPEDIC SURGERY | Age: 44
End: 2019-04-05

## 2019-04-05 NOTE — TELEPHONE ENCOUNTER
[employer] REQUEST SHORT NARRATIVE NOTE, SETTING FORTH THE CLAIMANT;S SPECIFIC DIAGNOSIS BICEPS TENDONITIS RIGHT. DIRECT result of injury    FLOW THROUGH from the initial diagnosis which required surgical intervention OR resulted in a second injury due to fall OR other injury which resulted in the requested condition(s)    SUBSTANTIAL AGGRAVATION OF PRE-EXISTING. The IW was not under current treatment for the requested condition and report no issues until after the injury. Therefore the condition was substantially aggravated by the work related injury.

## 2019-04-07 NOTE — PROGRESS NOTES
SHOULDER CONSULTATION    Referring Provider: DR Rubens Nicholson    Primary Care Provider: Dr Tasneem Gannon    Chief Complaint    Shoulder Pain (RIGHT SHOULDER)      History of Present Illness:  George Walls is a 39 y.o. female who is seen today in consultation, thoughtfully referred by Dr. Edi Mercado, for evaluation of some persistent right shoulder pain after a work-related injury in late 2017    Celena Valera is very pleasant 58-year-old female who carries multiple unfortunate diagnoses including history of right arm DVT, factor V Leiden disorder currently on Xarelto. Her work-related injury resulted in a slightly comminuted but nondisplaced greater tuberosity fracture of the right proximal humerus. She was treated conservatively as appropriately indicated. Unfortunately, she developed significant pain and functional limitations including loss of movement consistent with adhesive capsulitis. Her female gender and hypothyroidism were significant risk factors for this. After the failure of conservative treatment she was very appropriately taken for arthroscopic evaluation in June 2018 by Dr. Edi Mercado. At that time, he encountered unstable anterior labral tears which he repaired with suture anchors. He also performed debridement as well as acromioplasty and distal clavicle excision to maximize her outcome and minimize any persistent pain generators. After the surgery she was improved. She she was able to return to work as a . However, she is persisted in having some baseline pain. Pain with movement. She has tried subsequent injections and physical therapy but feels that she's had a plateau. She does report pain through the upper trapezius and scapular region also. Pain radiating into the anterior deltoid and lateral elbow at times. No true radicular symptoms. She is interested in discussing what options might be available to further improve her shoulder.          Medical History:  Patient's medications, allergies, past medical, surgical, social and family histories were reviewed and updated as appropriate. Review of Systems:  Pertinent items are noted in HPI  Review of systems reviewed from Patient History Form dated on April 2, 2019 and available in the patient's chart under the Media tab. Vital Signs:  Ht 5' 4.02\" (1.626 m)   Wt 170 lb 13.7 oz (77.5 kg)   LMP 07/07/2014 (Approximate)   BMI 29.31 kg/m²       General Exam:   Constitutional: Patient is adequately groomed with no evidence of malnutrition  Mental Status: The patient is oriented to time, place and person. The patient's mood and affect are appropriate. Vascular: Examination reveals no swelling or calf tenderness. Peripheral pulses are palpable and 2+. Neurological: The patient has good coordination. There is no weakness or sensory deficit. Shoulder Examination:    Inspection:  She does demonstrate mild thoracic kyphosis with some resultant forward head posture and scapular protraction. This results in some tightness of the pectoralis minor and weak inferior trapezius    Palpation:  She does demonstrate some tenderness over the greater tuberosity and lateral cuff insertion at the top bicipital groove    Range of Motion:  Range of motion movement evaluation does demonstrate a 15-20° posterior capsular contracture only. However she's regained reasonably good range of motion movement in the other arc of direction including rotation and forward flexion    Strength:  Her isometric strength testing does demonstrate some pain in the Oniel's and [place]'s positions. She has maintained external rotation force although it does re-create pain. Firm belly press    Special Tests:  She is pain with dynamic active compression test, I detect no glenohumeral instability on sulcus or load shift. There is no lag sign. Stable acromioclavicular joint    Skin: There are no rashes, ulcerations or lesions.     Gait: Stable gait with no assistive devices    Spine excellent cervical rotation    Additional Comments:         Radiology:      of the right shoulder. X-rays from March 6, 2019 were reviewed. Minimal degenerative changes are appreciated. No complications from the previous operative procedure. Assessment :  My clinical assessment is the complex picture of persistent pain after a work-related greater tuberosity fracture, labral tear, and orthoscopic surgery. I believe that the source of the pain could be related to the long head biceps which has been noted to become tenosynovitic, torn, and entrapped in the setting of tuberosity fractures or previous labral repairs. She also may be experiencing some degree of persistent myofascial pains as a result of the persistent stiffness and weakness from fracture      Office Procedures:  Orders Placed This Encounter   Procedures    MRI SHOULDER RIGHT [address] [address] [facility] [employer]     Workers comp. Standing Status:   Future     Standing Expiration Date:   4/2/2020     Order Specific Question:   Reason for exam:     Answer: With arthrogram       Treatment Plan: We had a very good visit today. We began by discussing the anatomy and mechanics of the shoulder. I did my best to expressed to her that there are multiple pain generators in the shoulder and it is difficult to assess the exact etiology of her symptoms. Given that it has been a year I have recommended that we consider an MRI with arthrogram to evaluate her intra-articular structures, particularly the long head biceps. We'll also be able to assess if there is been any attritional damage to the footprint of the rotator cuff in the setting of her previous tuberosity fracture. Rule out other structuralProblems causing her pain. Based upon these results we could then consider diagnostic and therapeutic injections into the joint, bicipital groove or subacromial space.   We could also consider therapeutic interventions for myofascial pain for arthroscopic intervention only if we believe it has a very high likelihood of success. I believe she left today with a good understanding of her options. Forward to seeing her back with the results. We appreciate the opportunity to care for this patient. The trust that is implicit in this referral does not go unnoticed. We will do our very best to provide high-quality care that goes above and beyond standards. Please feel free contact us with any questions or concerns about this patient.                 [address] [facility] Partner of [facility] and Sports Medicine Surgery

## 2019-04-25 ENCOUNTER — OFFICE VISIT (OUTPATIENT)
Dept: ORTHOPEDIC SURGERY | Age: 44
End: 2019-04-25
Payer: COMMERCIAL

## 2019-04-25 VITALS — WEIGHT: 170 LBS | HEIGHT: 64 IN | BODY MASS INDEX: 29.02 KG/M2

## 2019-04-25 DIAGNOSIS — M75.01 ADHESIVE CAPSULITIS OF RIGHT SHOULDER: Primary | ICD-10-CM

## 2019-04-25 PROCEDURE — 99213 OFFICE O/P EST LOW 20 MIN: CPT | Performed by: ORTHOPAEDIC SURGERY

## 2019-04-25 NOTE — PROGRESS NOTES
Department of Orthopedic Surgery   Progress Note      Follow-Up: Right shoulder MRI results    Subjective:     [facility] has been feeling moderately better while being off for spring break      Objective:     @IPVITALS(24)@    Review of Systems  Pertinent items are noted in HPI  Denies fever, chills, confusion, bowel/bladder active change. Review of systems reviewed from Patient History Form dated on April 25 and available in the patient's chart under the Media tab. Exam: Unchanged      Imaging:  Careful review of her MRI demonstrates a healed tuberosity fracture. There is perhaps some persistent edema consistent with progression but no displacement and certainly no nellie nonunion. Most notable appears to be the capsular thickening and inflammation through the entire inferior capsule. Consistent with the development of adhesive capsulitis. Degenerative tearing of the supra labrum. Office Procedures:      Assessment:     #1. Healing greater tuberosity fracture    #2. The development of posttraumatic adhesive capsulitis. Plan:      1:  We had a great discussion today. She's had adhesive capsulitis in the past which did resolve. We described her the pathology which is related to a autoimmune reaction which is certainly not her fault. However, the messages encouraging as there is very high rate of resolution    The inflammatory nature of the diagnosis of adhesive capsulitis of the shoulder was discussed with the patient. This included a description of both idiopathic and posttraumatic adhesive capsulitis. We outlined some of the available research on inflammation and viscoelastic changes in the capsule of the glenohumeral joint. During this discussion provided a good understanding of supportive care through heat, ice, topicals, soft tissue treatments, the judicious use of anti-inflammatories, and perhaps 1 or 2 corticosteroid injections.   The role for physical therapy once the pain level has reduced to teach and improve clinical humeral mobility was also noted. Finally, we discussed the excellent research on the high rate of resolution with time over the natural history - greater than 90%. Surgical intervention is considered as a salvage only and rarely indicated for patients with over 6 months of failed conservative treatment and a complete plateau with no improvement. Patient voiced understanding of this.          Follow-Up Visit:  prn            [address] [facility] Partner of [facility] and Sports Medicine Surgery

## 2019-05-03 ENCOUNTER — OFFICE VISIT (OUTPATIENT)
Dept: ORTHOPEDIC SURGERY | Age: 44
End: 2019-05-03
Payer: COMMERCIAL

## 2019-05-03 DIAGNOSIS — S49.91XA INJURY OF RIGHT SHOULDER, INITIAL ENCOUNTER: Primary | ICD-10-CM

## 2019-05-03 DIAGNOSIS — S40.011A CONTUSION OF RIGHT SHOULDER, INITIAL ENCOUNTER: ICD-10-CM

## 2019-05-03 DIAGNOSIS — S46.811A TRAPEZIUS STRAIN, RIGHT, INITIAL ENCOUNTER: ICD-10-CM

## 2019-05-03 DIAGNOSIS — M75.01 ADHESIVE CAPSULITIS OF RIGHT SHOULDER: ICD-10-CM

## 2019-05-03 PROCEDURE — 99214 OFFICE O/P EST MOD 30 MIN: CPT | Performed by: ORTHOPAEDIC SURGERY

## 2019-05-03 NOTE — LETTER
[facility]  [address]  [address] [place] [address]  Phone: [phone]  Fax: [phone]    Sabino Wise MD        May 3, 2019     Patient: Stefanie Holbrook   YOB: 1975   Date of Visit: 5/3/2019       To Whom It May Concern: It is my medical opinion that Twyla Jean may return to light duty immediately with the following restrictions: no work involving right arm. Restrictions are in effect for 4 weeks. If you have any questions or concerns, please don't hesitate to call.     Sincerely,        Sabino Wise MD

## 2019-05-03 NOTE — PROGRESS NOTES
Department of Orthopedic Surgery   Progress Note      Follow-Up: Acute traumatic right shoulder injury    Subjective:     Celena Valera is a 43-year-old female that we've seen twice previously. She has been documented to have a healing nondisplaced greater tuberosity fracture as well as the development of superimposed adhesive capsulitis of the shoulder. Documented on previous MRI. She comes in today with an acute injury. She works as a  for the [employer]. She slipped on the stairs. Her arm was caught on a handrail. She struck the inside of her brachium with significant force and has swelling and ecchymosis. She felt a tearing sensation in the shoulder. She's having severe pain in the upper trapezius. Objective:     @IPVITALS(24)@    Review of Systems  Pertinent items are noted in HPI  Denies fever, chills, confusion, bowel/bladder active change. Review of systems reviewed from Patient History Form dated on May 3, 2019 and available in the patient's chart under the Media tab. Exam: On exam today she's holding arm in a adductor tendon internally rotated apprehensive posture. She has visible swelling and ecchymosis on the medial aspect of the distal brachium. She does have a normal neurovascular exam of the hand in full movement at the elbow. She notes pain with gentle gliding of the humeral head. She has severe tenderness in the upper trapezius. Pain at the superior medial border of the scapula. Imaging:  X-rays obtained and reviewed in the office today include 4 views of the right shoulder. Subtle abnormalities of the tuberosity are appreciated. Mild degenerative changes. No fractures or dislocations. Office Procedures:      Assessment:     My clinical impression is an acute contusion to the medial brachium and strain of the upper trapezius. This is in the setting of a previous diagnosis of adhesive capsulitis and healing greater tuberosity fracture.      Plan: 1:  I've recommended that we treat the acute injury with ice, anti-inflammatories and activity modification. I do believe she would benefit from some therapy to help her with soft tissue treatments upper trapezius, scapular posturing and to encourage gentle glenohumeral movement. 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 We will allow one armed light duty work. Follow-Up Visit:  We will reassess in 3-4 weeks.             [address] [facility] Partner of [facility] and Sports Medicine Surgery

## 2019-05-03 NOTE — LETTER
[facility]  [address]   [address] [place] [facility] [address] [address]  Phone: [phone]  Fax: [phone]    Loren Salinas MD        May 3, 2019     Patient: Jeremie Gonzalez   YOB: 1975   Date of Visit: 5/3/2019       To Whom It May Concern: It is my medical opinion that Garth Santamaria should remain out of work until 5/31/19. If you have any questions or concerns, please don't hesitate to call.     Sincerely,        Loren Salinas MD

## 2019-05-21 ENCOUNTER — HOSPITAL ENCOUNTER (OUTPATIENT)
Dept: PHYSICAL THERAPY | Age: 44
Setting detail: THERAPIES SERIES
Discharge: HOME | End: 2019-05-21
Payer: COMMERCIAL

## 2019-05-21 PROCEDURE — 97140 MANUAL THERAPY 1/> REGIONS: CPT | Performed by: PHYSICAL THERAPIST

## 2019-05-21 PROCEDURE — G0283 ELEC STIM OTHER THAN WOUND: HCPCS | Performed by: PHYSICAL THERAPIST

## 2019-05-21 PROCEDURE — 97161 PT EVAL LOW COMPLEX 20 MIN: CPT | Performed by: PHYSICAL THERAPIST

## 2019-05-21 PROCEDURE — 97110 THERAPEUTIC EXERCISES: CPT | Performed by: PHYSICAL THERAPIST

## 2019-05-21 NOTE — FLOWSHEET NOTE
[place] [address] [facility], [address] [place]  Phone: [phone]  Fax [phone]      Physical Therapy Daily Treatment Note  Date:  2019    Patient Name:  Gennaro Richmond    :  1975  MRN: [id number]  Restrictions/Precautions:    Medical/Treatment Diagnosis Information:  · Diagnosis: S49.91XA (ICD-10-CM) - Injury of right shoulder, initial encounter, M75.01 (ICD-10-CM) - Adhesive capsulitis of right shoulder, S40.011A (ICD-10-CM) - Contusion of right shoulder, initial encounter, S68.980W (ICD-10-CM) - Trapezius strain, right, initial encounter  · Treatment Diagnosis: S49.91XA (ICD-10-CM) - Injury of right shoulder, initial encounter, M75.01 (ICD-10-CM) - Adhesive capsulitis of right shoulder, S40.011A (ICD-10-CM) - Contusion of right shoulder, initial encounter, K32.335R (ICD-10-CM) - Trapezius strain, right, initial encounter  Insurance/Certification information:  PT Insurance Information: [facility] 6 visits 19-6/3/19  Physician Information:  Referring Practitioner: Ron Harp of care signed (Y/N):     Date of Patient follow up with Physician: 19    G-Code (if applicable):      Date G-Code Applied:  19   Qdash 57%    Progress Note: [x]  Yes  []  No  Next due by: Visit #10      Latex Allergy:  [x]NO      []YES  Preferred Language for Healthcare:   [x]English       []other:    Visit # Insurance Allowable Requires auth   1 6 through 6/3/19    []no        [x]yes:     Pain level:  5-6/10     SUBJECTIVE:  See eval    OBJECTIVE: See eval  Observation:   Test measurements:      RESTRICTIONS/PRECAUTIONS: Hx DVT, anxiety    Exercises/Interventions:   Therapeutic Ex Sets/rep comments   iso's ER, IR 10x10\" HEP   TB row  TB fwd punch RTT 3x10  RTT 3x10 HEP  HEP   Wall slides flexion 2x10 HEP                                                                              PT ed: POC, HEP, activity mod, posture, icing x8' Manual Intervention     STM post shoulder, biceps, pec; PROM R shoulder flex/ER/IR/abd x15'                                  NMR re-education                                                 Therapeutic Exercise and NMR EXR  [x] (29359) Provided verbal/tactile cueing for activities related to strengthening, flexibility, endurance, ROM  for improvements in scapular, scapulothoracic and UE control with self care, reaching, carrying, lifting, house/yardwork, driving/computer work. [x] (17136) Provided verbal/tactile cueing for activities related to improving balance, coordination, kinesthetic sense, posture, motor skill, proprioception  to assist with  scapular, scapulothoracic and UE control with self care, reaching, carrying, lifting, house/yardwork, driving/computer work. Therapeutic Activities:    [] (43490 or 77018) Provided verbal/tactile cueing for activities related to improving balance, coordination, kinesthetic sense, posture, motor skill, proprioception and motor activation to allow for proper function of scapular, scapulothoracic and UE control with self care, carrying, lifting, driving/computer work.      Home Exercise Program:    [x] (12239) Reviewed/Progressed HEP activities related to strengthening, flexibility, endurance, ROM of scapular, scapulothoracic and UE control with self care, reaching, carrying, lifting, house/yardwork, driving/computer work  [] (02047) Reviewed/Progressed HEP activities related to improving balance, coordination, kinesthetic sense, posture, motor skill, proprioception of scapular, scapulothoracic and UE control with self care, reaching, carrying, lifting, house/yardwork, driving/computer work      Manual Treatments:  PROM / STM / Oscillations-Mobs:  G-I, II, III, IV (PA's, Inf., Post.)  [x] (45845) Provided manual therapy to mobilize soft tissue/joints of cervical/CT, scapular GHJ and UE for the purpose of modulating pain, promoting relaxation,  increasing ROM, due to co-morbidities.   [x] Plan just implemented, too soon to assess goals progression  [] Other:     ASSESSMENT:  See eval    Treatment/Activity Tolerance:  [x] Patient tolerated treatment well [] Patient limited by fatique  [] Patient limited by pain  [] Patient limited by other medical complications  [] Other:     Prognosis: [x] Good [] Fair  [] Poor    Patient Requires Follow-up: [x] Yes  [] No    PLAN: See eval  [] Continue per plan of care [] Alter current plan (see comments)  [x] Plan of care initiated [] Hold pending MD visit [] Discharge    Electronically signed by: Dylan Mulligan PT

## 2019-05-21 NOTE — PLAN OF CARE
[facility], [address]  Phone: [phone]  Fax [phone]     Physical Therapy Certification    Dear Referring Practitioner: Jessica Tomlinson,    We had the pleasure of evaluating the following patient for physical therapy services at [address] [place] [address]. A summary of our findings can be found in the initial assessment below. This includes our plan of care. If you have any questions or concerns regarding these findings, please do not hesitate to contact me at the office phone number checked above.   Thank you for the referral.       Physician Signature:_______________________________Date:__________________  By signing above (or electronic signature), therapists plan is approved by physician    Patient: Kaylee Newman   : 1975   MRN: [id number]  Referring Physician: Referring Practitioner: Jessica Tomlinson      Evaluation Date: 2019      Medical Diagnosis Information:  Diagnosis: S49.91XA (ICD-10-CM) - Injury of right shoulder, initial encounter, M75.01 (ICD-10-CM) - Adhesive capsulitis of right shoulder, S40.011A (ICD-10-CM) - Contusion of right shoulder, initial encounter, F29.929P (ICD-10-CM) - Trapezius strain, right, initial encounter   Treatment Diagnosis: S49.91XA (ICD-10-CM) - Injury of right shoulder, initial encounter, M75.01 (ICD-10-CM) - Adhesive capsulitis of right shoulder, S40.011A (ICD-10-CM) - Contusion of right shoulder, initial encounter, H40.995W (ICD-10-CM) - Trapezius strain, right, initial encounter                                         Insurance information: PT Insurance Information: [facility] 6 visits 19-6/3/19    Precautions/ Contra-indications: none  Latex Allergy:  [x]NO      []YES  Preferred Language for Healthcare:   [x]English       []other:    SUBJECTIVE: Patient stated complaint:Pt reports her shoulder had continued to bother her since finishing PT, but she has Patient has been instructed to contact their primary care physician regarding ROS issues if not already being addressed at this time. Co-morbidities/Complexities (which will affect course of rehabilitation):   []None           Arthritic conditions   []Rheumatoid arthritis (M05.9)  []Osteoarthritis (M19.91)   Cardiovascular conditions   []Hypertension (I10)  []Hyperlipidemia (E78.5)  []Angina pectoris (I20)  []Atherosclerosis (I70)   Musculoskeletal conditions   []Disc pathology   []Congenital spine pathologies   []Prior surgical intervention  []Osteoporosis (M81.8)  [x]Osteopenia (M85.8)   Endocrine conditions   []Hypothyroid (E03.9)  []Hyperthyroid Gastrointestinal conditions   []Constipation (L43.70)   Metabolic conditions   []Morbid obesity (E66.01)  []Diabetes type 1(E10.65) or 2 (E11.65)   []Neuropathy (G60.9)     Pulmonary conditions   []Asthma (J45)  []Coughing   []COPD (J44.9)   Psychological Disorders  [x]Anxiety (F41.9)  []Depression (F32.9)   []Other:   [x]Other:     Mild SLAP tear     Barriers to/and or personal factors that will affect rehab potential:              []Age  []Sex              []Motivation/Lack of Motivation                        []Co-Morbidities              []Cognitive Function, education/learning barriers              []Environmental, home barriers              []profession/work barriers  []past PT/medical experience  []other:  Justification:      Falls Risk Assessment (30 days):   [x] Falls Risk assessed and no intervention required.   [] Falls Risk assessed and Patient requires intervention due to being higher risk   TUG score (>12s at risk):     [] Falls education provided, including       G-Codes:   QDash 57%    ASSESSMENT:   Functional Impairments   []Noted spinal or UE joint hypomobility   []Noted spinal or UE joint hypermobility   [x]Decreased UE functional ROM   [x]Decreased UE functional strength   []Abnormal reflexes/sensation/myotomal/dermatomal deficits   [x]Decreased RC/scapular/core strength and neuromuscular control   []other:      Functional Activity Limitations (from functional questionnaire and intake)   [x]Reduced ability to tolerate prolonged functional positions   []Reduced ability or difficulty with changes of positions or transfers between positions   [x]Reduced ability to maintain good posture and demonstrate good body mechanics with sitting, bending, and lifting   [x] Reduced ability or tolerance with driving and/or computer work   []Reduced ability to sleep   [x]Reduced ability to perform lifting, reaching, carrying tasks   [x]Reduced ability to tolerate impact through UE   [x]Reduced ability to reach behind back   []Reduced ability to  or hold objects   [x]Reduced ability to throw or toss an object   []other:    Participation Restrictions   []Reduced participation in self care activities   []Reduced participation in home management activities   [x]Reduced participation in work activities   [x]Reduced participation in social activities. []Reduced participation in sport/recreation activities. Classification:   []Signs/symptoms consistent with post-surgical status including decreased ROM, strength and function.   [x]Signs/symptoms consistent with joint sprain/strain   []Signs/symptoms consistent with shoulder impingement   []Signs/symptoms consistent with shoulder/elbow/wrist tendinopathy   []Signs/symptoms consistent with Rotator cuff tear   []Signs/symptoms consistent with labral tear   [x]Signs/symptoms consistent with postural dysfunction    []Signs/symptoms consistent with Glenohumeral IR Deficit - <45 degrees   []Signs/symptoms consistent with facet dysfunction of cervical/thoracic spine    []Signs/symptoms consistent with pathology which may benefit from Dry needling     []other:     Prognosis/Rehab Potential:      []Excellent   [x]Good    []Fair   []Poor    Tolerance of evaluation/treatment:    []Excellent   [x]Good    []Fair   []Poor  Physical Therapy Evaluation Complexity Justification  [x] A history of present problem with:  [] no personal factors and/or comorbidities that impact the plan of care;  [x]1-2 personal factors and/or comorbidities that impact the plan of care  []3 personal factors and/or comorbidities that impact the plan of care  [x] An examination of body systems using standardized tests and measures addressing any of the following: body structures and functions (impairments), activity limitations, and/or participation restrictions;:  [x] a total of 1-2 or more elements   [] a total of 3 or more elements   [] a total of 4 or more elements   [x] A clinical presentation with:  [x] stable and/or uncomplicated characteristics   [] evolving clinical presentation with changing characteristics  [] unstable and unpredictable characteristics;   [x] Clinical decision making of [x] low, [] moderate, [] high complexity using standardized patient assessment instrument and/or measurable assessment of functional outcome. [x] EVAL (LOW) 89732 (typically 20 minutes face-to-face)  [] EVAL (MOD) 48679 (typically 30 minutes face-to-face)  [] EVAL (HIGH) 81583 (typically 45 minutes face-to-face)  [] RE-EVAL       PLAN:  Frequency/Duration:  1-2 days per week for 4 Weeks:  INTERVENTIONS:  [x] Therapeutic exercise including: strength training, ROM, for Upper extremity and core   [x]  NMR activation and proprioception for UE, scap and Core   [x] Manual therapy as indicated for shoulder, scapula and spine to include: Dry Needling/IASTM, STM, PROM, Gr I-IV mobilizations, manipulation. [x] Modalities as needed that may include: thermal agents, E-stim, Biofeedback, US, iontophoresis as indicated  [x] Patient education on joint protection, postural re-education, activity modification, progression of HEP. 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 Independent in HEP and progression per patient tolerance, in order to prevent re-injury. 2. Patient will have a decrease in pain to facilitate improvement in movement, function, and ADLs as indicated by Functional Deficits. Long Term Goals: To be achieved in: 4 weeks  1. Disability index score of 25% or less for the [facility] to assist with reaching prior level of function. 2. Patient will demonstrate increased AROM to = to L to allow for proper joint functioning as indicated by patients Functional Deficits. 3. Patient will demonstrate an increase in Strength to R shoulder grossly 4/5 to allow for proper functional mobility as indicated by patients Functional Deficits. 4. Patient will return to house hold functional activities without increased symptoms or restriction.    5. 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   (patient specific functional goal)         Electronically signed by:  Carroll Salcedo PT

## 2019-05-29 ENCOUNTER — APPOINTMENT (OUTPATIENT)
Dept: PHYSICAL THERAPY | Age: 44
End: 2019-05-29
Payer: COMMERCIAL

## 2019-12-03 ENCOUNTER — TELEPHONE (OUTPATIENT)
Dept: ORTHOPEDIC SURGERY | Age: 44
End: 2019-12-03

## 2020-01-09 ENCOUNTER — TELEPHONE (OUTPATIENT)
Dept: ORTHOPEDIC SURGERY | Age: 45
End: 2020-01-09

## 2021-03-02 NOTE — FLOWSHEET NOTE
Rx sent to pharm    kinesthetic sense, posture, motor skill, proprioception  to assist with  scapular, scapulothoracic and UE control with self care, reaching, carrying, lifting, house/yardwork, driving/computer work. Therapeutic Activities:    [] (79748 or 88874) Provided verbal/tactile cueing for activities related to improving balance, coordination, kinesthetic sense, posture, motor skill, proprioception and motor activation to allow for proper function of scapular, scapulothoracic and UE control with self care, carrying, lifting, driving/computer work.      Home Exercise Program:    [x] (73921) Reviewed/Progressed HEP activities related to strengthening, flexibility, endurance, ROM of scapular, scapulothoracic and UE control with self care, reaching, carrying, lifting, house/yardwork, driving/computer work  [] (15128) Reviewed/Progressed HEP activities related to improving balance, coordination, kinesthetic sense, posture, motor skill, proprioception of scapular, scapulothoracic and UE control with self care, reaching, carrying, lifting, house/yardwork, driving/computer work      Manual Treatments:  PROM / STM / Oscillations-Mobs:  G-I, II, III, IV (PA's, Inf., Post.)  [x] (57135) Provided manual therapy to mobilize soft tissue/joints of cervical/CT, scapular GHJ and UE for the purpose of modulating pain, promoting relaxation,  increasing ROM, reducing/eliminating soft tissue swelling/inflammation/restriction, improving soft tissue extensibility and allowing for proper ROM for normal function with self care, reaching, carrying, lifting, house/yardwork, driving/computer work    Modalities:  PM/CP x15' R shoulder 12:30-12:45  Charges:  Timed Code Treatment Minutes: 60   Total Treatment Minutes: 75     [] EVAL (LOW) 92690 (typically 20 minutes face-to-face)  [] EVAL (MOD) 08180 (typically 30 minutes face-to-face)  [] EVAL (HIGH) 11379 (typically 45 minutes face-to-face)  [] RE-EVAL     [x] OU(66895) x  3 11:45-12:30 [] IONTO  [] NMR

## 2021-03-09 ENCOUNTER — HOSPITAL ENCOUNTER (OUTPATIENT)
Dept: MAMMOGRAPHY | Age: 46
Discharge: HOME | End: 2021-03-14
Payer: COMMERCIAL

## 2021-03-09 DIAGNOSIS — Z12.31 VISIT FOR SCREENING MAMMOGRAM: ICD-10-CM

## 2024-07-23 ENCOUNTER — APPOINTMENT (OUTPATIENT)
Dept: CT IMAGING | Age: 49
End: 2024-07-23

## 2024-07-23 ENCOUNTER — HOSPITAL ENCOUNTER (EMERGENCY)
Age: 49
Discharge: HOME | End: 2024-07-23
Attending: STUDENT IN AN ORGANIZED HEALTH CARE EDUCATION/TRAINING PROGRAM

## 2024-07-23 VITALS
SYSTOLIC BLOOD PRESSURE: 98 MMHG | WEIGHT: 156 LBS | HEIGHT: 64 IN | BODY MASS INDEX: 26.63 KG/M2 | RESPIRATION RATE: 19 BRPM | TEMPERATURE: 98.5 F | HEART RATE: 98 BPM | OXYGEN SATURATION: 100 % | DIASTOLIC BLOOD PRESSURE: 61 MMHG

## 2024-07-23 DIAGNOSIS — S09.90XA INJURY OF HEAD, INITIAL ENCOUNTER: Primary | ICD-10-CM

## 2024-07-23 DIAGNOSIS — S06.0X0A CONCUSSION WITHOUT LOSS OF CONSCIOUSNESS, INITIAL ENCOUNTER: ICD-10-CM

## 2024-07-23 LAB
ANION GAP SERPL CALCULATED.3IONS-SCNC: 10 MMOL/L (ref 3–16)
ANTI-XA UNFRAC HEPARIN: <0.1 IU/ML (ref 0.3–0.7)
BASOPHILS # BLD: 0 K/UL (ref 0–0.2)
BASOPHILS NFR BLD: 0.8 %
BUN SERPL-MCNC: 21 MG/DL (ref 7–20)
CALCIUM SERPL-MCNC: 9.7 MG/DL (ref 8.3–10.6)
CHLORIDE SERPL-SCNC: 105 MMOL/L (ref 99–110)
CO2 SERPL-SCNC: 27 MMOL/L (ref 21–32)
CREAT SERPL-MCNC: 0.8 MG/DL (ref 0.6–1.1)
DEPRECATED RDW RBC AUTO: 13.5 % (ref 12.4–15.4)
EKG ATRIAL RATE: 107 BPM
EKG DIAGNOSIS: NORMAL
EKG P AXIS: 52 DEGREES
EKG P-R INTERVAL: 152 MS
EKG Q-T INTERVAL: 352 MS
EKG QRS DURATION: 70 MS
EKG QTC CALCULATION (BAZETT): 469 MS
EKG R AXIS: 8 DEGREES
EKG T AXIS: 72 DEGREES
EKG VENTRICULAR RATE: 107 BPM
EOSINOPHIL # BLD: 0.2 K/UL (ref 0–0.6)
EOSINOPHIL NFR BLD: 2.8 %
GFR SERPLBLD CREATININE-BSD FMLA CKD-EPI: 90 ML/MIN/{1.73_M2}
GLUCOSE BLD-MCNC: 142 MG/DL (ref 70–99)
GLUCOSE SERPL-MCNC: 94 MG/DL (ref 70–99)
HCT VFR BLD AUTO: 39.9 % (ref 36–48)
HGB BLD-MCNC: 13.1 G/DL (ref 12–16)
LYMPHOCYTES # BLD: 2.1 K/UL (ref 1–5.1)
LYMPHOCYTES NFR BLD: 38.7 %
MCH RBC QN AUTO: 31 PG (ref 26–34)
MCHC RBC AUTO-ENTMCNC: 32.9 G/DL (ref 31–36)
MCV RBC AUTO: 94.3 FL (ref 80–100)
MONOCYTES # BLD: 0.5 K/UL (ref 0–1.3)
MONOCYTES NFR BLD: 9.8 %
NEUTROPHILS # BLD: 2.6 K/UL (ref 1.7–7.7)
NEUTROPHILS NFR BLD: 47.9 %
NT-PROBNP SERPL-MCNC: <36 PG/ML (ref 0–124)
PERFORMED ON: ABNORMAL
PLATELET # BLD AUTO: 270 K/UL (ref 135–450)
PMV BLD AUTO: 8.2 FL (ref 5–10.5)
POTASSIUM SERPL-SCNC: 3.8 MMOL/L (ref 3.5–5.1)
RBC # BLD AUTO: 4.23 M/UL (ref 4–5.2)
SODIUM SERPL-SCNC: 142 MMOL/L (ref 136–145)
TROPONIN, HIGH SENSITIVITY: 7 NG/L (ref 0–14)
TROPONIN, HIGH SENSITIVITY: 7 NG/L (ref 0–14)
WBC # BLD AUTO: 5.5 K/UL (ref 4–11)

## 2024-07-23 PROCEDURE — 96375 TX/PRO/DX INJ NEW DRUG ADDON: CPT

## 2024-07-23 PROCEDURE — 96374 THER/PROPH/DIAG INJ IV PUSH: CPT

## 2024-07-23 PROCEDURE — 99284 EMERGENCY DEPT VISIT MOD MDM: CPT

## 2024-07-23 PROCEDURE — 80048 BASIC METABOLIC PNL TOTAL CA: CPT

## 2024-07-23 PROCEDURE — 93010 ELECTROCARDIOGRAM REPORT: CPT | Performed by: INTERNAL MEDICINE

## 2024-07-23 PROCEDURE — 72125 CT NECK SPINE W/O DYE: CPT

## 2024-07-23 PROCEDURE — 6360000002 HC RX W HCPCS: Performed by: STUDENT IN AN ORGANIZED HEALTH CARE EDUCATION/TRAINING PROGRAM

## 2024-07-23 PROCEDURE — 93005 ELECTROCARDIOGRAM TRACING: CPT | Performed by: STUDENT IN AN ORGANIZED HEALTH CARE EDUCATION/TRAINING PROGRAM

## 2024-07-23 PROCEDURE — 83880 ASSAY OF NATRIURETIC PEPTIDE: CPT

## 2024-07-23 PROCEDURE — 70450 CT HEAD/BRAIN W/O DYE: CPT

## 2024-07-23 PROCEDURE — 84484 ASSAY OF TROPONIN QUANT: CPT

## 2024-07-23 PROCEDURE — 85025 COMPLETE CBC W/AUTO DIFF WBC: CPT

## 2024-07-23 PROCEDURE — 85520 HEPARIN ASSAY: CPT

## 2024-07-23 RX ORDER — KETOROLAC TROMETHAMINE 15 MG/ML
15 INJECTION, SOLUTION INTRAMUSCULAR; INTRAVENOUS ONCE
Status: COMPLETED | OUTPATIENT
Start: 2024-07-23 | End: 2024-07-23

## 2024-07-23 RX ORDER — ONDANSETRON 2 MG/ML
4 INJECTION INTRAMUSCULAR; INTRAVENOUS ONCE
Status: COMPLETED | OUTPATIENT
Start: 2024-07-23 | End: 2024-07-23

## 2024-07-23 RX ADMIN — KETOROLAC TROMETHAMINE 15 MG: 15 INJECTION, SOLUTION INTRAMUSCULAR; INTRAVENOUS at 18:24

## 2024-07-23 RX ADMIN — ONDANSETRON 4 MG: 2 INJECTION INTRAMUSCULAR; INTRAVENOUS at 16:21

## 2024-07-23 NOTE — DISCHARGE INSTRUCTIONS
You were evaluated in the emergency department for head injury. Assessments and testing completed during your visit were reassuring and at this time there is no indication for further testing, treatment or admission to the hospital. Given this it is appropriate to discharge you from the emergency department. At the time of discharge we discussed the following:    I believe you have a concussion after striking her head I do expect her condition to improve over the next few days you may use Tylenol or ibuprofen at home for symptoms.  Certainly if your condition is worsening please return to the emergency department otherwise please follow-up to your primary doctor for further recommendations    Please note that sometimes it is difficult to diagnose a medical condition early in the disease process before the disease is fully manifest. Because of this, should you develop any new or worsening symptoms, you may return at any time to the emergency department for another evaluation. If available you are also recommended to review this visit with your primary care physician or other medical provider in the next 7 days. Thank you for allowing us to care for you today.

## 2024-07-23 NOTE — ED NOTES
All follow up care discussed. PIV removed and bleeding controlled. Pt able to stand and walk to door. Pt states she tolerated fine.

## 2024-07-24 NOTE — ED PROVIDER NOTES
the emergency room and the patient was appropriate for discharge in good condition with concussive syndrome secondary to closed head injury.  Of note with the patient suffered no overt syncope cardiac markers and EKG were evaluated which were benign    ED Course as of 07/24/24 0147 Tue Jul 23, 2024   1708 Troponin, High Sensitivity: 7 [NG]   1708 Pro-BNP: <36 [NG]   1708 Sodium: 142 [NG]   1708 Potassium: 3.8 [NG]   1708 Chloride: 105 [NG]   1708 CARBON DIOXIDE: 27 [NG]   1708 Anion Gap: 10 [NG]   1708 Glucose: 94 [NG]   1708 BUN,BUNPL(!): 21 [NG]   1708 Creatinine: 0.8 [NG]   1708 Calcium: 9.7 [NG]   1708 Anti-XA Unfrac Heparin(!): <0.10 [NG]   1708 WBC: 5.5 [NG]   1708 Hemoglobin Quant: 13.1 [NG]   1708 Hematocrit: 39.9 [NG]   1708 Platelet Count: 270 [NG]   1708 POC Glucose(!): 142 [NG]   Wed Jul 24, 2024   0144 Pro-BNP: <36 [NG]   0145 Troponin, High Sensitivity: 7  Benign cardiac markers EKG nonischemic [NG]   0145 Anti-XA Unfrac Heparin(!): <0.10  Patient reported on DOAC though appears subtherapeutic on her antiten a here [NG]   0145 Sodium: 142 [NG]   0145 Potassium: 3.8 [NG]   0145 Chloride: 105 [NG]   0146 CARBON DIOXIDE: 27 [NG]   0146 Anion Gap: 10 [NG]   0146 Glucose: 94 [NG]   0146 BUN,BUNPL(!): 21 [NG]   0146 Creatinine: 0.8 [NG]   0146 Calcium: 9.7  BMP benign [NG]   0146 WBC: 5.5 [NG]   0146 Hemoglobin Quant: 13.1 [NG]   0146 Hematocrit: 39.9 [NG]   0146 Platelet Count: 270  Blood counts benign [NG]   0146 CT imaging was resulted without acute abnormality. [NG]      ED Course User Index  [NG] Austin Parks MD       Additional Reassessment    Is this patient to be included in the SEP-1 core measure? No Exclusion criteria - the patient is NOT to be included for SEP-1 Core Measure due to: Infection is not suspected    Medical Decision Making  Presentation for closed head injury with concussive syndrome reassuring course appropriate for continued outpatient management    Problems

## 2024-10-03 ENCOUNTER — HOSPITAL ENCOUNTER (EMERGENCY)
Age: 49
Discharge: HOME | End: 2024-10-03
Attending: EMERGENCY MEDICINE
Payer: COMMERCIAL

## 2024-10-03 ENCOUNTER — APPOINTMENT (OUTPATIENT)
Dept: CT IMAGING | Age: 49
End: 2024-10-03
Payer: COMMERCIAL

## 2024-10-03 VITALS
WEIGHT: 160 LBS | RESPIRATION RATE: 18 BRPM | HEIGHT: 65 IN | SYSTOLIC BLOOD PRESSURE: 121 MMHG | OXYGEN SATURATION: 96 % | TEMPERATURE: 98.3 F | BODY MASS INDEX: 26.66 KG/M2 | DIASTOLIC BLOOD PRESSURE: 77 MMHG | HEART RATE: 93 BPM

## 2024-10-03 DIAGNOSIS — S20.211A CONTUSION OF RIGHT CHEST WALL, INITIAL ENCOUNTER: Primary | ICD-10-CM

## 2024-10-03 LAB
ANION GAP SERPL CALCULATED.3IONS-SCNC: 8 MMOL/L (ref 3–16)
BASOPHILS # BLD: 0.1 K/UL (ref 0–0.2)
BASOPHILS NFR BLD: 1.1 %
BUN SERPL-MCNC: 22 MG/DL (ref 7–20)
CALCIUM SERPL-MCNC: 9.1 MG/DL (ref 8.3–10.6)
CHLORIDE SERPL-SCNC: 108 MMOL/L (ref 99–110)
CO2 SERPL-SCNC: 26 MMOL/L (ref 21–32)
CREAT SERPL-MCNC: 1.1 MG/DL (ref 0.6–1.1)
D-DIMER QUANTITATIVE: 0.39 UG/ML FEU (ref 0–0.6)
DEPRECATED RDW RBC AUTO: 13.3 % (ref 12.4–15.4)
EOSINOPHIL # BLD: 0.2 K/UL (ref 0–0.6)
EOSINOPHIL NFR BLD: 2.9 %
GFR SERPLBLD CREATININE-BSD FMLA CKD-EPI: 61 ML/MIN/{1.73_M2}
GLUCOSE SERPL-MCNC: 115 MG/DL (ref 70–99)
HCT VFR BLD AUTO: 36 % (ref 36–48)
HGB BLD-MCNC: 12.1 G/DL (ref 12–16)
LYMPHOCYTES # BLD: 2.7 K/UL (ref 1–5.1)
LYMPHOCYTES NFR BLD: 46.8 %
MAGNESIUM SERPL-MCNC: 2.2 MG/DL (ref 1.8–2.4)
MCH RBC QN AUTO: 31.8 PG (ref 26–34)
MCHC RBC AUTO-ENTMCNC: 33.6 G/DL (ref 31–36)
MCV RBC AUTO: 94.6 FL (ref 80–100)
MONOCYTES # BLD: 0.4 K/UL (ref 0–1.3)
MONOCYTES NFR BLD: 7.6 %
NEUTROPHILS # BLD: 2.4 K/UL (ref 1.7–7.7)
NEUTROPHILS NFR BLD: 41.6 %
PLATELET # BLD AUTO: 308 K/UL (ref 135–450)
PMV BLD AUTO: 9.1 FL (ref 5–10.5)
POTASSIUM SERPL-SCNC: 3.5 MMOL/L (ref 3.5–5.1)
RBC # BLD AUTO: 3.81 M/UL (ref 4–5.2)
REASON FOR REJECTION: NORMAL
REJECTED TEST: NORMAL
SODIUM SERPL-SCNC: 142 MMOL/L (ref 136–145)
WBC # BLD AUTO: 5.7 K/UL (ref 4–11)

## 2024-10-03 PROCEDURE — 96361 HYDRATE IV INFUSION ADD-ON: CPT

## 2024-10-03 PROCEDURE — 6360000002 HC RX W HCPCS: Performed by: EMERGENCY MEDICINE

## 2024-10-03 PROCEDURE — 85379 FIBRIN DEGRADATION QUANT: CPT

## 2024-10-03 PROCEDURE — 96374 THER/PROPH/DIAG INJ IV PUSH: CPT

## 2024-10-03 PROCEDURE — 71250 CT THORAX DX C-: CPT

## 2024-10-03 PROCEDURE — 2580000003 HC RX 258: Performed by: EMERGENCY MEDICINE

## 2024-10-03 PROCEDURE — 99284 EMERGENCY DEPT VISIT MOD MDM: CPT

## 2024-10-03 PROCEDURE — 85025 COMPLETE CBC W/AUTO DIFF WBC: CPT

## 2024-10-03 PROCEDURE — 80048 BASIC METABOLIC PNL TOTAL CA: CPT

## 2024-10-03 PROCEDURE — 83735 ASSAY OF MAGNESIUM: CPT

## 2024-10-03 RX ORDER — HYDROCODONE BITARTRATE AND ACETAMINOPHEN 5; 325 MG/1; MG/1
1 TABLET ORAL DAILY PRN
COMMUNITY
Start: 2024-07-10

## 2024-10-03 RX ORDER — GABAPENTIN 300 MG/1
300 CAPSULE ORAL 2 TIMES DAILY
COMMUNITY

## 2024-10-03 RX ORDER — 0.9 % SODIUM CHLORIDE 0.9 %
1000 INTRAVENOUS SOLUTION INTRAVENOUS ONCE
Status: COMPLETED | OUTPATIENT
Start: 2024-10-03 | End: 2024-10-03

## 2024-10-03 RX ORDER — FENTANYL CITRATE 50 UG/ML
50 INJECTION, SOLUTION INTRAMUSCULAR; INTRAVENOUS ONCE
Status: COMPLETED | OUTPATIENT
Start: 2024-10-03 | End: 2024-10-03

## 2024-10-03 RX ORDER — INDOMETHACIN 25 MG/1
25 CAPSULE ORAL 2 TIMES DAILY WITH MEALS
COMMUNITY

## 2024-10-03 RX ORDER — METHOCARBAMOL 500 MG/1
500 TABLET, FILM COATED ORAL 3 TIMES DAILY PRN
COMMUNITY
Start: 2024-05-21

## 2024-10-03 RX ORDER — FAMOTIDINE 20 MG/1
20 TABLET, FILM COATED ORAL EVERY MORNING
COMMUNITY

## 2024-10-03 RX ORDER — ACETAMINOPHEN 500 MG
1000 TABLET ORAL 3 TIMES DAILY PRN
Qty: 84 TABLET | Refills: 0 | Status: SHIPPED | OUTPATIENT
Start: 2024-10-03

## 2024-10-03 RX ADMIN — SODIUM CHLORIDE 1000 ML: 9 INJECTION, SOLUTION INTRAVENOUS at 15:19

## 2024-10-03 RX ADMIN — FENTANYL CITRATE 50 MCG: 50 INJECTION INTRAMUSCULAR; INTRAVENOUS at 18:00

## 2024-10-03 ASSESSMENT — PAIN - FUNCTIONAL ASSESSMENT: PAIN_FUNCTIONAL_ASSESSMENT: 0-10

## 2024-10-03 ASSESSMENT — LIFESTYLE VARIABLES
HOW MANY STANDARD DRINKS CONTAINING ALCOHOL DO YOU HAVE ON A TYPICAL DAY: PATIENT DOES NOT DRINK
HOW OFTEN DO YOU HAVE A DRINK CONTAINING ALCOHOL: NEVER

## 2024-10-03 ASSESSMENT — ENCOUNTER SYMPTOMS
SORE THROAT: 0
COLOR CHANGE: 0
NAUSEA: 0
VOMITING: 0
RHINORRHEA: 0
SHORTNESS OF BREATH: 0
PHOTOPHOBIA: 0
ABDOMINAL PAIN: 0
EYE REDNESS: 0

## 2024-10-03 ASSESSMENT — PAIN DESCRIPTION - LOCATION: LOCATION: BACK;BREAST

## 2024-10-03 ASSESSMENT — PAIN SCALES - GENERAL
PAINLEVEL_OUTOF10: 5
PAINLEVEL_OUTOF10: 9
PAINLEVEL_OUTOF10: 6

## 2024-10-03 ASSESSMENT — PAIN DESCRIPTION - ORIENTATION
ORIENTATION: RIGHT
ORIENTATION: RIGHT

## 2024-10-03 ASSESSMENT — PAIN DESCRIPTION - DESCRIPTORS
DESCRIPTORS: THROBBING;TEARING
DESCRIPTORS: STABBING

## 2024-10-03 NOTE — ED PROVIDER NOTES
Emergency Department Attending Physician Note  Location: [facility]  ED  10/3/2024       Pt Name: Cindy Mccarthy  MRN: [id number]  Birthdate 1975    Date of evaluation: 10/3/2024  Provider: Michaelle Tena  PCP: Elizabeth Conner    Note Started: 3:23 PM EDT 10/3/24    CHIEF COMPLAINT  Chief Complaint   Patient presents with    Fall     Pt out walking on Saturday while raining. Boot slipped and pt fell on right side and hurt ribs. Denies hitting head, no LOC. Pt takes blood thinners.        ROOM: 24     HISTORY OF PRESENT ILLNESS:  History obtained by patient. Limitations to history : None.     Cindy Mccarthy is a 49 y.o. female with a significant PMHx of clotting disorder prescribed Xaralto, patient endorses taking every other day 2/t financial reasons. She presents for right lateral rib pain x 6 days. Patient wearing moonboot on R foot due to displaced toes following hyperextension injury 1 month ago. On 9/28, patient was walking outside, lost her footing, stating her moonboot slipped, and she fell landing on her R side and R hand. She is complaining of R lateral rib pain, 6/10, radiates to R breast. No skin changes/bruising noted. Patient reports the pain is \"so bad she can't sleep at night\" so she took 2 Hydrocodone that she has for migraines last night to help her sleep. Otherwise she has been managing pain with tylenol and advil, last doses @ 830 AM. Educated patient that she should not be using NSAIDs due to her being on thinners.     Denies head trauma. Denies LOC.      Nursing Notes were all reviewed and agreed with or any disagreements were addressed in the HPI.      MEDICAL HISTORY  Past Medical History:   Diagnosis Date    Anesthesia     PONV    Anxiety     Deep vein thrombosis (DVT) (HCC)     Factor V Leiden (HCC)     Fracture of proximal humerus 02/2018    right    Hx of blood clots 2012    right forearm, after an IV    Hx of blood clots 04/2018    right arm post fx  dizziness, weakness, light-headedness, numbness and headaches.     Positives and Pertinent negatives as per HPI.    _____________________________________      PHYSICAL EXAM:     Vitals:    10/03/24 1628 10/03/24 1736 10/03/24 1847 10/03/24 1852   BP: 108/60 134/78 121/77 121/77   Pulse: 93 94  93   Resp:   16 18   Temp:       TempSrc:       SpO2: 100% 100% 98% 96%   Weight:       Height:           Physical Exam  Constitutional:       General: She is not in acute distress.     Appearance: She is obese.   HENT:      Head: Normocephalic and atraumatic.   Cardiovascular:      Rate and Rhythm: Normal rate and regular rhythm.      Pulses: Normal pulses.      Heart sounds: Normal heart sounds.   Pulmonary:      Effort: Pulmonary effort is normal.      Breath sounds: Normal breath sounds.   Abdominal:      General: Abdomen is flat.      Palpations: Abdomen is soft.      Tenderness: There is no abdominal tenderness.   Musculoskeletal:         General: Normal range of motion.      Cervical back: Normal range of motion and neck supple. No tenderness.      Comments: R lateral to anterior rib pain, reproducible with palpation   Skin:     Capillary Refill: Capillary refill takes less than 2 seconds.      Findings: No rash.   Neurological:      Mental Status: She is alert.           DIAGNOSTIC RESULTS:  LABS:    Labs Reviewed   CBC WITH AUTO DIFFERENTIAL - Abnormal; Notable for the following components:       Result Value    RBC 3.81 (*)     All other components within normal limits   BASIC METABOLIC PANEL W/ REFLEX TO MG FOR LOW K - Abnormal; Notable for the following components:    Glucose 115 (*)     BUN 22 (*)     All other components within normal limits   SPECIMEN REJECTION    Narrative:     CALL  [place]  [facility] tel. [phone],  Rejected Test Name/Called to: Clayton Mcfarlane RN ED/[facility], 10/03/2024 15:26,  by JAMES   D-DIMER, QUANTITATIVE   MAGNESIUM       When ordered, only abnormal lab results are displayed. All other labs were

## 2024-10-03 NOTE — ED PROVIDER NOTES
[facility]  ED     EMERGENCY DEPARTMENT ENCOUNTER            Pt Name: Cindy Mccarthy   MRN: [id number]   Birthdate 1975   Date of evaluation: 10/3/2024   Provider: Kameron Rapp MD   PCP: Elizabeth Conner   Note Started: 2:58 PM EDT 10/3/24          CHIEF COMPLAINT     Chief Complaint   Patient presents with    Fall     Pt out walking on Saturday while raining. Boot slipped and pt fell on right side and hurt ribs. Denies hitting head, no LOC. Pt takes blood thinners.              HISTORY OF PRESENT ILLNESS:   History from : Patient   Limitations to history : None     Cindy Mccarthy is a 49 y.o. female who presents complaining right lateral rib pain.  This patient has a past medical history of anxiety, DVT, factor V Leiden disorder, other.  She currently is in a walking boot because of a toe injury.  While she is prescribed Xarelto she is currently only taking it every other day due to financial reasons.  The patient denies any head injury.  She denies any fever or chills.  No abdominal pain.  No nausea or vomiting.  She states she just has right lateral rib pain.  No rash    Nursing Notes were all reviewed and agreed with, or any disagreements were addressed in the HPI.     REVIEW OF SYSTEMS :    Review of Systems   Constitutional:  Negative for fever.   HENT:  Negative for rhinorrhea and sore throat.    Eyes:  Negative for redness.   Respiratory:  Negative for shortness of breath.    Cardiovascular:  Negative for palpitations and leg swelling.        Right lateral rib pain   Gastrointestinal:  Negative for abdominal pain.   Genitourinary:  Negative for flank pain.   Skin:  Negative for rash.   Neurological:  Negative for headaches.   Hematological:  Negative for adenopathy.   Psychiatric/Behavioral:  Negative for confusion.         MEDICAL HISTORY   has a past medical history of Anesthesia, Anxiety, Deep vein thrombosis (DVT) (HCC), Factor V Leiden (HCC), Fracture      PROCEDURES   Unless otherwise noted below, none     CRITICAL CARE TIME            Vitals:    Vitals:    10/03/24 1628 10/03/24 1736 10/03/24 1847 10/03/24 1852   BP: 108/60 134/78 121/77 121/77   Pulse: 93 94  93   Resp:   16 18   Temp:       TempSrc:       SpO2: 100% 100% 98% 96%   Weight:       Height:                 Is this patient to be included in the SEP-1 Core Measure due to severe sepsis or septic shock?   No   Exclusion criteria - the patient is NOT to be included for SEP-1 Core Measure due to:  Infection is not suspected       CC/HPI Summary, DDx, ED Course, and Reassessment: DDX: Rib fracture, multiple rib fractures, pneumothorax, hemothorax, chest contusion, other    During the history the patient endorses that she has been taking intermittent nonsteroidal anti-inflammatory medications.  She was advised not to do this anymore while she is on a direct oral anticoagulant medication.    Workup the patient shows a normal magnesium, BMP is significant for glucose of 115 and a BUN of 22 with a creatinine 1.1 and normal GFR.  I have a low pretest probability for PE and the patient's D-dimer is negative so I do not feel that further workup is indicated for that.  The patient CBC shows no anemia.  No leukocytosis.  CT scan shows no acute cardiopulmonary process.  There is no acute osseous abnormality.  There is a 3 mm nodule in the right upper lobe that is unchanged from 2018 and the radiologist feels is benign at this time.  Patient be treated symptomatically with Tylenol 1 g p.o. every 8.  She is advised not to take the Tylenol if she takes her Norco at home.  She is under the care of the pain specialist and further pain management will be modified by them only.       Patient was given the following medications:   Medications   sodium chloride 0.9 % bolus 1,000 mL (0 mLs IntraVENous Stopped 10/3/24 1629)   fentaNYL (SUBLIMAZE) injection 50 mcg (50 mcg IntraVENous Given 10/3/24 1800)        CONSULTS: (Who